# Patient Record
Sex: MALE | Race: WHITE | NOT HISPANIC OR LATINO | Employment: FULL TIME | URBAN - NONMETROPOLITAN AREA
[De-identification: names, ages, dates, MRNs, and addresses within clinical notes are randomized per-mention and may not be internally consistent; named-entity substitution may affect disease eponyms.]

---

## 2017-05-11 ENCOUNTER — HOSPITAL ENCOUNTER (OUTPATIENT)
Dept: PHYSICAL THERAPY | Facility: HOSPITAL | Age: 62
Setting detail: THERAPIES SERIES
Discharge: HOME | End: 2017-05-11

## 2017-05-11 ENCOUNTER — TRANSCRIBE ORDERS (OUTPATIENT)
Dept: PHYSICAL THERAPY | Facility: HOSPITAL | Age: 62
End: 2017-05-11

## 2017-05-11 DIAGNOSIS — M54.2 NECK PAIN: Primary | ICD-10-CM

## 2017-05-11 DIAGNOSIS — M54.2 CERVICALGIA: Primary | ICD-10-CM

## 2017-05-11 PROCEDURE — 97161 PT EVAL LOW COMPLEX 20 MIN: CPT

## 2017-05-18 ENCOUNTER — HOSPITAL ENCOUNTER (OUTPATIENT)
Dept: PHYSICAL THERAPY | Facility: HOSPITAL | Age: 62
Setting detail: THERAPIES SERIES
Discharge: HOME | End: 2017-05-18

## 2017-05-18 DIAGNOSIS — M54.2 NECK PAIN: Primary | ICD-10-CM

## 2017-05-18 PROCEDURE — 97140 MANUAL THERAPY 1/> REGIONS: CPT

## 2017-05-25 ENCOUNTER — HOSPITAL ENCOUNTER (OUTPATIENT)
Dept: PHYSICAL THERAPY | Facility: HOSPITAL | Age: 62
Setting detail: THERAPIES SERIES
Discharge: HOME | End: 2017-05-25

## 2017-05-25 DIAGNOSIS — M54.2 NECK PAIN: Primary | ICD-10-CM

## 2017-05-25 PROCEDURE — 97110 THERAPEUTIC EXERCISES: CPT

## 2017-05-25 PROCEDURE — 97140 MANUAL THERAPY 1/> REGIONS: CPT

## 2017-06-01 ENCOUNTER — HOSPITAL ENCOUNTER (OUTPATIENT)
Dept: PHYSICAL THERAPY | Facility: HOSPITAL | Age: 62
Setting detail: THERAPIES SERIES
Discharge: HOME | End: 2017-06-01

## 2017-06-01 DIAGNOSIS — M54.2 NECK PAIN: Primary | ICD-10-CM

## 2017-06-01 PROCEDURE — 97140 MANUAL THERAPY 1/> REGIONS: CPT

## 2017-06-01 PROCEDURE — 97110 THERAPEUTIC EXERCISES: CPT

## 2017-06-01 NOTE — THERAPY TREATMENT NOTE
Outpatient Physical Therapy Ortho Treatment Note  [facility]     Patient Name: Israel Campos  : 1955  MRN: [id number]  Today's Date: 2017      Visit Date: 2017    Visit Dx:    ICD-10-CM ICD-9-CM   1. Neck pain M54.2 723.1       There is no problem list on file for this patient.       Past Medical History:   Diagnosis Date   • Atrial fibrillation    • Hypertension         No past surgical history on file.                          PT Assessment/Plan       17 1400       PT Assessment    Assessment Comments Patient is still limited in upper cervical mobility with guarding noted.  The scapular upward rotation is still quite limited, placing more strain on the neck with overhead motion.  Thoracic extension endurance is also poor with eccentric UE control.  -RS     PT Plan    PT Plan Comments Assess response to HEP; thoracic mobility and focus on upright posture to decrease cervical strain.  AA mobility  -RS       User Key  (r) = Recorded By, (t) = Taken By, (c) = Cosigned By    Initials Name Provider Type    RS Danielito Junior, PT DPT Physical Therapist                    Exercises       17 1300          Subjective Comments    Subjective Comments Pain with looking up and overhead today, as well as turning to the right.  -RS      Subjective Pain    Able to rate subjective pain? yes  -RS      Pre-Treatment Pain Level 5  -RS      Post-Treatment Pain Level 2  -RS      Exercise 1    Exercise Name 1 thoracic extension against foam roller:  eccentric UE flexion with 2# dowel  -RS      Cueing 1 Demo  -RS      Sets 1 3  -RS      Reps 1 5  -RS      Time (Seconds) 1 5 sec lowering  -RS      Exercise 2    Exercise Name 2 facing serratus wall slides   used foam roller to assist  -RS      Cueing 2 Demo  -RS      Sets 2 3  -RS      Reps 2 10  -RS      Exercise 3    Exercise Name 3 standing W into Y push press  -RS      Cueing 3 Demo  -RS      Equipment 3 Theraband  -RS      Resistance 3 Yellow   -RS      Sets 3 3  -RS      Reps 3 12  -RS      Exercise 4    Exercise Name 4 january standing Paloff Press  -RS      Cueing 4 Demo  -RS      Equipment 4 Theraband  -RS      Resistance 4 Blue  -RS      Sets 4 3  -RS      Time (Seconds) 4 30 (on each)  -RS      Exercise 5    Exercise Name 5 went over HEP:  patient given instructions with demonstration adequately  -RS        User Key  (r) = Recorded By, (t) = Taken By, (c) = Cosigned By    Initials Name Provider Type    RS Danielito Junior, PT DPT Physical Therapist                        Manual Rx (last 36 hours)      Manual Treatments       06/01/17 1345          Manual Rx 1    Manual Rx 1 Location Supine neck musculature  -RS      Manual Rx 1 Type soft tissue mobilization  -RS      Manual Rx 1 Duration 10  -RS      Manual Rx 2    Manual Rx 2 Location Supine A-A  -RS      Manual Rx 2 Type PVMS  -RS      Manual Rx 2 Duration 5  -RS        User Key  (r) = Recorded By, (t) = Taken By, (c) = Cosigned By    Initials Name Provider Type    RS Danielito Junior, PT DPT Physical Therapist                PT OP Goals       06/01/17 1300       PT Short Term Goals    STG Date to Achieve 06/01/17  -RS     STG 1 Patient will report a 50% improvement in symptoms on the VAS  -RS     STG 1 Progress Ongoing  -RS     STG 1 Progress Comments 5/10 today; average 2-5/10  -RS     STG 2 Patient will demonstrate 25% improvement in cervical AROM into rotation to each side  -RS     STG 2 Progress Ongoing  -RS     STG 2 Progress Comments PROM improves after treatment; right rotation most limited today  -RS     Long Term Goals    LTG Date to Achieve 06/22/17  -RS     LTG 1 Patient will be independent with a comprehensive HEP by discharge  -RS     LTG 1 Progress Progressing  -RS     LTG 2 Patient will improve cervical AROM into all planes to at least 75% of normal with pain no greater than 2/10 on the VAS by discharge.  -RS     LTG 2 Progress Ongoing  -RS     LTG 3 Patient will demonstrate a  more neutral thoracic posture to alleviate excessive CT junction strain by discharge.  -RS     LTG 3 Progress Ongoing  -RS     LTG 4 Patient will score <4 on the NDI (neck disability index) outcome measure by discharge.  -RS     LTG 4 Progress Ongoing  -RS     Time Calculation    PT Goal Re-Cert Due Date 06/10/17  -RS       User Key  (r) = Recorded By, (t) = Taken By, (c) = Cosigned By    Initials Name Provider Type    RS Danielito Junior, PT DPT Physical Therapist                Therapy Education       06/01/17 1400          Therapy Education    Given HEP;Symptoms/condition management  -RS      Program New   serratus slides, foam roller ext, anti rotations  -RS      How Provided Written;Demonstration  -RS      Provided to Patient  -RS      Level of Understanding Demonstrated;Verbalized  -RS        User Key  (r) = Recorded By, (t) = Taken By, (c) = Cosigned By    Initials Name Provider Type    ANDER Junior, PT DPT Physical Therapist                Time Calculation:   Start Time: 1345  Stop Time: 1430  Time Calculation (min): 45 min  PT Non-Billable Time (min): 4 min  Total Timed Code Minutes- PT: 41 minute(s)    Therapy Charges for Today     Code Description Service Date Service Provider Modifiers Qty    93004497837 HC PT THER PROC EA 15 MIN 6/1/2017 Danielito Junior, PT DPT GP 2    67067221193 HC PT MANUAL THERAPY EA 15 MIN 6/1/2017 Danielito Junior, PT DPT GP 1                    KATELIN Junior, PT DPT  6/1/2017

## 2017-06-08 ENCOUNTER — HOSPITAL ENCOUNTER (OUTPATIENT)
Dept: PHYSICAL THERAPY | Facility: HOSPITAL | Age: 62
Setting detail: THERAPIES SERIES
Discharge: HOME | End: 2017-06-08

## 2017-06-08 DIAGNOSIS — M54.2 NECK PAIN: Primary | ICD-10-CM

## 2017-06-08 PROCEDURE — 97140 MANUAL THERAPY 1/> REGIONS: CPT

## 2017-06-08 NOTE — THERAPY PROGRESS REPORT/RE-CERT
Outpatient Physical Therapy Ortho Progress Note  [facility]     Patient Name: Israel Campos  : 1955  MRN: [id number]  Today's Date: 2017      Visit Date: 2017    Visit Dx:    ICD-10-CM ICD-9-CM   1. Neck pain M54.2 723.1       There is no problem list on file for this patient.       Past Medical History:   Diagnosis Date   • Atrial fibrillation    • Hypertension         No past surgical history on file.                          PT Assessment/Plan       17 0900       PT Assessment    Functional Limitations Performance in leisure activities;Performance in work activities  -RS     Impairments Joint mobility;Muscle strength;Pain;Posture;Range of motion  -RS     Assessment Comments Patient is making slow progress at this time.  The chronic postural issues and tissue adaptation are barriers to rehab, but are improving slowly with treatment.  Patient is more aware of his postural faults especially while at work.  We are working to improve the thoracic extensor and scapular component (trapezius and serratus) to help stiffen the mid thoracic in a more upright position, as well as help his golf swing.  The AA joint is still limited on the left > right and is a likely source of joint pain/referred pain.  Thank you for allowing us to work with this patient.  -RS     Please refer to paper survey for additional self-reported information Yes  -RS     Rehab Potential Good  -RS     Patient/caregiver participated in establishment of treatment plan and goals Yes  -RS     Patient would benefit from skilled therapy intervention Yes  -RS     PT Plan    PT Frequency 1x/week  -RS     Predicted Duration of Therapy Intervention (days/wks) 4 weeks  -RS     Planned CPT's? PT THER PROC EA 15 MIN: 58011;PT MANUAL THERAPY EA 15 MIN: 49842;PT NEUROMUSC RE-EDUCATION EA 15 MIN: 17820;PT HOT OR COLD PACK TREAT MCARE;PT ELECTRICAL STIM UNATTEND: ;PT ELECTRICAL STIM ATTD EA 15 MIN: 16728  -RS     Physical Therapy  Interventions (Optional Details) home exercise program;joint mobilization;manual therapy techniques;neuromuscular re-education;patient/family education;postural re-education;ROM (Range of Motion);strengthening;stretching;taping;swiss ball techniques  -RS     PT Plan Comments We will continue to improve both soft tissue and cervical joint mobility; continue with thoracic mobility at the CT junction.  Continue with periscapular strengthening with focus on thoracic extension and scapular upward rotation.  -RS       User Key  (r) = Recorded By, (t) = Taken By, (c) = Cosigned By    Initials Name Provider Type    RS Danielito Junior, PT DPT Physical Therapist                    Exercises       06/08/17 1000          Subjective Comments    Subjective Comments Patient reporting continued nonconsistent compliance with his HEP.  He does claim he feels a positive correlation with performing the HEP and decreased pain.  -RS      Subjective Pain    Able to rate subjective pain? yes  -RS      Pre-Treatment Pain Level 4  -RS      Post-Treatment Pain Level 3  -RS      Exercise 1    Exercise Name 1 Prone hands behind head with scapular adduction  -RS      Cueing 1 Verbal  -RS      Sets 1 3  -RS      Reps 1 10  -RS        User Key  (r) = Recorded By, (t) = Taken By, (c) = Cosigned By    Initials Name Provider Type    RS Danielito Junior, PT DPT Physical Therapist                        Manual Rx (last 36 hours)      Manual Treatments       06/08/17 0900          Manual Rx 1    Manual Rx 1 Location Prone neck musculature   -RS      Manual Rx 1 Type soft tissue mobilization with EDGE tool  -RS      Manual Rx 1 Grade min to mod OP  -RS      Manual Rx 1 Duration 15  -RS      Manual Rx 2    Manual Rx 2 Location Prone cervical spine /unilateral PAs  -RS      Manual Rx 2 Type Oscillatory  -RS      Manual Rx 2 Grade 2/3  -RS      Manual Rx 2 Duration 10  -RS      Manual Rx 3    Manual Rx 3 Location prone upper thoracic  -RS       Manual Rx 3 Type oscillatory  -RS      Manual Rx 3 Grade 3/4  -RS      Manual Rx 3 Duration 10  -RS      Manual Rx 4    Manual Rx 4 Location prone january AA/OA   -RS      Manual Rx 4 Type unilateral and central PA  -RS      Manual Rx 4 Grade 2/3 oscillatory  -RS      Manual Rx 4 Duration 3 on each  -RS        User Key  (r) = Recorded By, (t) = Taken By, (c) = Cosigned By    Initials Name Provider Type    RS Danielito Junior, PT DPT Physical Therapist                PT OP Goals       06/08/17 1000       PT Short Term Goals    STG Date to Achieve 06/01/17  -RS     STG 1 Patient will report a 50% improvement in symptoms on the VAS  -RS     STG 1 Progress Progressing  -RS     STG 1 Progress Comments 4/10; improving  -RS     STG 2 Patient will demonstrate 25% improvement in cervical AROM into rotation to each side  -RS     STG 2 Progress Ongoing  -RS     STG 2 Progress Comments left AROM limited 50% into rotation; right rotation STG met.  -RS     Long Term Goals    LTG Date to Achieve 06/22/17  -RS     LTG 1 Patient will be independent with a comprehensive HEP by discharge  -RS     LTG 1 Progress Progressing  -RS     LTG 2 Patient will improve cervical AROM into all planes to at least 75% of normal with pain no greater than 2/10 on the VAS by discharge.  -RS     LTG 2 Progress Ongoing  -RS     LTG 2 Progress Comments 50% limited left rotation; 2-4/10 symptoms  -RS     LTG 3 Patient will demonstrate a more neutral thoracic posture to alleviate excessive CT junction strain by discharge.  -RS     LTG 3 Progress Progressing  -RS     LTG 4 Patient will score <4 on the NDI (neck disability index) outcome measure by discharge.  -RS     LTG 4 Progress Ongoing  -RS     LTG 4 Progress Comments Patient scored a 10 on the NDI.  -RS     Time Calculation    PT Goal Re-Cert Due Date 07/08/17  -RS       User Key  (r) = Recorded By, (t) = Taken By, (c) = Cosigned By    Initials Name Provider Type    ANDER Junior, PT DPT  Physical Therapist                Therapy Education       06/08/17 1100          Therapy Education    Given HEP;Symptoms/condition management  -RS      Program Reinforced   serratus slides, foam roller ext, anti rotations  -RS      How Provided Written;Demonstration  -RS      Provided to Patient  -RS      Level of Understanding Demonstrated;Verbalized  -RS        User Key  (r) = Recorded By, (t) = Taken By, (c) = Cosigned By    Initials Name Provider Type    RS Danielito Junior, PT DPT Physical Therapist                Outcome Measures       06/08/17 1015          Neck Disability Index    Section 1 - Pain Intensity 2  -RS      Section 2 - Personal Care 1  -RS      Section 3 - Lifting 1  -RS      Section 4 - Work 0  -RS      Section 5 - Headaches 1  -RS      Section 6 - Concentration 0  -RS      Section 7 - Sleeping 1  -RS      Section 8 - Driving 1  -RS      Section 9 - Reading 2  -RS      Section 10 - Recreation 1  -RS      Neck Disability Index Score 10  -RS      Functional Assessment    Outcome Measure Options Neck Disability Index (NDI)  -RS        User Key  (r) = Recorded By, (t) = Taken By, (c) = Cosigned By    Initials Name Provider Type    RS Danielito Junior, PT DPT Physical Therapist            Time Calculation:   Start Time: 1018  Stop Time: 1103  Time Calculation (min): 45 min  Total Timed Code Minutes- PT: 45 minute(s)    Therapy Charges for Today     Code Description Service Date Service Provider Modifiers Qty    72164243392 HC PT MANUAL THERAPY EA 15 MIN 6/8/2017 Danielito Junior, PT DPT GP 3          PT G-Codes  Outcome Measure Options: Neck Disability Index (NDI)         KATELIN Junior, PT DPT  6/8/2017

## 2017-06-15 ENCOUNTER — TELEPHONE (OUTPATIENT)
Dept: INTERNAL MEDICINE | Age: 62
End: 2017-06-15

## 2017-06-15 ENCOUNTER — APPOINTMENT (OUTPATIENT)
Dept: PHYSICAL THERAPY | Facility: HOSPITAL | Age: 62
End: 2017-06-15

## 2017-06-22 ENCOUNTER — APPOINTMENT (OUTPATIENT)
Dept: PHYSICAL THERAPY | Facility: HOSPITAL | Age: 62
End: 2017-06-22

## 2017-06-29 ENCOUNTER — APPOINTMENT (OUTPATIENT)
Dept: PHYSICAL THERAPY | Facility: HOSPITAL | Age: 62
End: 2017-06-29

## 2017-07-05 RX ORDER — ALPRAZOLAM 0.25 MG/1
0.25 TABLET ORAL 2 TIMES DAILY PRN
COMMUNITY
End: 2017-07-05 | Stop reason: SDUPTHER

## 2017-07-06 RX ORDER — ALPRAZOLAM 0.25 MG/1
0.25 TABLET ORAL 2 TIMES DAILY PRN
Qty: 180 TABLET | Refills: 1 | Status: SHIPPED | OUTPATIENT
Start: 2017-07-06 | End: 2018-02-14 | Stop reason: SDUPTHER

## 2017-07-13 ENCOUNTER — APPOINTMENT (OUTPATIENT)
Dept: PHYSICAL THERAPY | Facility: HOSPITAL | Age: 62
End: 2017-07-13

## 2017-07-13 DIAGNOSIS — I10 ESSENTIAL HYPERTENSION, BENIGN: Primary | ICD-10-CM

## 2017-07-13 DIAGNOSIS — E78.5 HYPERLIPIDEMIA, UNSPECIFIED: ICD-10-CM

## 2017-07-13 DIAGNOSIS — Z00.00 ANNUAL PHYSICAL EXAM: ICD-10-CM

## 2017-07-13 RX ORDER — LOSARTAN POTASSIUM 50 MG/1
50 TABLET ORAL DAILY
Qty: 90 TABLET | Refills: 3 | Status: SHIPPED | OUTPATIENT
Start: 2017-07-13 | End: 2018-07-13 | Stop reason: SDUPTHER

## 2017-07-13 RX ORDER — ATENOLOL 50 MG/1
TABLET ORAL
COMMUNITY
Start: 2017-07-02 | End: 2017-10-06 | Stop reason: SDUPTHER

## 2017-07-17 RX ORDER — SIMVASTATIN 20 MG
TABLET ORAL
Qty: 90 TABLET | Refills: 2 | Status: SHIPPED | OUTPATIENT
Start: 2017-07-17 | End: 2018-04-16 | Stop reason: SDUPTHER

## 2017-07-20 ENCOUNTER — APPOINTMENT (OUTPATIENT)
Dept: PHYSICAL THERAPY | Facility: HOSPITAL | Age: 62
End: 2017-07-20

## 2017-08-09 ENCOUNTER — DOCUMENTATION (OUTPATIENT)
Dept: PHYSICAL THERAPY | Facility: HOSPITAL | Age: 62
End: 2017-08-09

## 2017-08-09 DIAGNOSIS — M54.2 NECK PAIN: Primary | ICD-10-CM

## 2017-08-09 NOTE — THERAPY DISCHARGE NOTE
Outpatient Physical Therapy Discharge Summary         Patient Name: Israel Campos  : 1955  MRN: [id number]    Today's Date: 2017    Visit Dx:    ICD-10-CM ICD-9-CM   1. Neck pain M54.2 723.1             PT OP Goals       17 1300       PT Short Term Goals    STG Date to Achieve 17  -RS     STG 1 Patient will report a 50% improvement in symptoms on the VAS  -RS     STG 1 Progress Not Met  -RS     STG 2 Patient will demonstrate 25% improvement in cervical AROM into rotation to each side  -RS     STG 2 Progress Not Met  -RS     Long Term Goals    LTG Date to Achieve 17  -RS     LTG 1 Patient will be independent with a comprehensive HEP by discharge  -RS     LTG 1 Progress Not Met  -RS     LTG 2 Patient will improve cervical AROM into all planes to at least 75% of normal with pain no greater than 2/10 on the VAS by discharge.  -RS     LTG 2 Progress Not Met  -RS     LTG 3 Patient will demonstrate a more neutral thoracic posture to alleviate excessive CT junction strain by discharge.  -RS     LTG 3 Progress Not Met  -RS     LTG 4 Patient will score <4 on the NDI (neck disability index) outcome measure by discharge.  -RS     LTG 4 Progress Not Met  -RS       User Key  (r) = Recorded By, (t) = Taken By, (c) = Cosigned By    Initials Name Provider Type    ANDER Junior, PT DPT Physical Therapist          OP PT Discharge Summary  Date of Discharge: 17  Reason for Discharge: Unable to participate  Outcomes Achieved: Unable to tolerate or actively participate in therapy, Refer to plan of care for updates on goals achieved  Discharge Destination: Unknown  Discharge Instructions: Patient was unable to consistently attend due to scheduling and personal issues.  I spoke with the patient on the phone and encouraged the patient to return to Dr. Campbell for imaging if the symptoms are not improved.      Time Calculation:                    KATELIN Junior, PT DPT  2017

## 2017-09-18 ENCOUNTER — TELEPHONE (OUTPATIENT)
Dept: INTERNAL MEDICINE | Age: 62
End: 2017-09-18

## 2017-09-18 RX ORDER — BENZONATATE 200 MG/1
200 CAPSULE ORAL 3 TIMES DAILY PRN
Qty: 30 CAPSULE | Refills: 0 | Status: SHIPPED | OUTPATIENT
Start: 2017-09-18 | End: 2017-09-25

## 2017-09-23 RX ORDER — METHYLPREDNISOLONE 4 MG/1
TABLET ORAL
Qty: 1 KIT | Refills: 0 | Status: SHIPPED | OUTPATIENT
Start: 2017-09-23 | End: 2017-09-29

## 2017-09-25 ENCOUNTER — TELEPHONE (OUTPATIENT)
Dept: INTERNAL MEDICINE | Age: 62
End: 2017-09-25

## 2017-09-25 RX ORDER — AZITHROMYCIN 250 MG/1
TABLET, FILM COATED ORAL
Qty: 1 PACKET | Refills: 0 | Status: SHIPPED | OUTPATIENT
Start: 2017-09-25 | End: 2017-10-05

## 2017-10-03 ENCOUNTER — PATIENT MESSAGE (OUTPATIENT)
Dept: INTERNAL MEDICINE | Age: 62
End: 2017-10-03

## 2017-10-04 NOTE — TELEPHONE ENCOUNTER
From: Sonia Granger  To: Kayy Escoto MD  Sent: 10/3/2017 6:48 PM CDT  Subject: Non-Urgent Medical Question    Is this system working? Sent message yesterday and haven't got a reply.

## 2017-10-04 NOTE — TELEPHONE ENCOUNTER
I do apologize for the delay in our response. You can try Mucinex over the counter.  is out of the office until Monday but if you would like to be seen by another provider I can make an appointment for you. Again I do apologize for the delay.   Thank you  Brandon Alejandro

## 2017-10-06 RX ORDER — ATENOLOL 50 MG/1
TABLET ORAL
Qty: 90 TABLET | Refills: 1 | Status: SHIPPED | OUTPATIENT
Start: 2017-10-06 | End: 2018-04-16 | Stop reason: SDUPTHER

## 2017-10-12 ENCOUNTER — PATIENT MESSAGE (OUTPATIENT)
Dept: INTERNAL MEDICINE | Age: 62
End: 2017-10-12

## 2017-10-12 DIAGNOSIS — R73.9 HYPERGLYCEMIA: ICD-10-CM

## 2017-10-12 DIAGNOSIS — E78.49 OTHER HYPERLIPIDEMIA: Primary | ICD-10-CM

## 2017-10-13 NOTE — TELEPHONE ENCOUNTER
From: Ran Atkins  To: Evita Ceballos MD  Sent: 10/12/2017 12:36 PM CDT  Subject: Non-Urgent Medical Question    Can you recommend a nutritionist to lose weight?  Thanks

## 2017-10-24 ENCOUNTER — TELEPHONE (OUTPATIENT)
Dept: INTERNAL MEDICINE | Age: 62
End: 2017-10-24

## 2018-02-14 RX ORDER — ALPRAZOLAM 0.25 MG/1
0.25 TABLET ORAL 2 TIMES DAILY PRN
Qty: 180 TABLET | Refills: 0 | Status: SHIPPED | OUTPATIENT
Start: 2018-02-14 | End: 2018-07-13 | Stop reason: SDUPTHER

## 2018-02-20 ENCOUNTER — PATIENT MESSAGE (OUTPATIENT)
Dept: INTERNAL MEDICINE | Age: 63
End: 2018-02-20

## 2018-02-20 NOTE — TELEPHONE ENCOUNTER
From: Ted Montes De Oca  To: Soraida Connell MD  Sent: 2/20/2018 1:45 PM CST  Subject: Prescription Question    Please refill Xanax.  Thanks

## 2018-04-04 ENCOUNTER — TELEPHONE (OUTPATIENT)
Dept: INTERNAL MEDICINE | Age: 63
End: 2018-04-04

## 2018-04-13 ENCOUNTER — TELEPHONE (OUTPATIENT)
Dept: INTERNAL MEDICINE | Age: 63
End: 2018-04-13

## 2018-04-16 RX ORDER — ATENOLOL 50 MG/1
TABLET ORAL
Qty: 90 TABLET | Refills: 3 | Status: SHIPPED | OUTPATIENT
Start: 2018-04-16 | End: 2019-03-26 | Stop reason: SDUPTHER

## 2018-04-16 RX ORDER — SIMVASTATIN 20 MG
TABLET ORAL
Qty: 90 TABLET | Refills: 3 | Status: SHIPPED | OUTPATIENT
Start: 2018-04-16 | End: 2019-04-15 | Stop reason: SDUPTHER

## 2018-05-14 RX ORDER — ESCITALOPRAM OXALATE 10 MG/1
TABLET ORAL
Qty: 30 TABLET | Refills: 5 | Status: SHIPPED | OUTPATIENT
Start: 2018-05-14 | End: 2018-10-15 | Stop reason: SDUPTHER

## 2018-07-12 DIAGNOSIS — E78.5 HYPERLIPIDEMIA, UNSPECIFIED: ICD-10-CM

## 2018-07-12 DIAGNOSIS — Z00.00 ANNUAL PHYSICAL EXAM: ICD-10-CM

## 2018-07-12 DIAGNOSIS — I10 ESSENTIAL HYPERTENSION, BENIGN: ICD-10-CM

## 2018-07-12 LAB
ALBUMIN SERPL-MCNC: 4.3 G/DL (ref 3.5–5.2)
ALP BLD-CCNC: 91 U/L (ref 40–130)
ALT SERPL-CCNC: 38 U/L (ref 5–41)
ANION GAP SERPL CALCULATED.3IONS-SCNC: 14 MMOL/L (ref 7–19)
AST SERPL-CCNC: 28 U/L (ref 5–40)
BILIRUB SERPL-MCNC: 0.5 MG/DL (ref 0.2–1.2)
BUN BLDV-MCNC: 13 MG/DL (ref 8–23)
CALCIUM SERPL-MCNC: 9.2 MG/DL (ref 8.8–10.2)
CHLORIDE BLD-SCNC: 101 MMOL/L (ref 98–111)
CHOLESTEROL, TOTAL: 211 MG/DL (ref 160–199)
CO2: 25 MMOL/L (ref 22–29)
CREAT SERPL-MCNC: 0.9 MG/DL (ref 0.5–1.2)
GFR NON-AFRICAN AMERICAN: >60
GLUCOSE BLD-MCNC: 115 MG/DL (ref 74–109)
HBA1C MFR BLD: 5.7 % (ref 4–6)
HCT VFR BLD CALC: 41.5 % (ref 42–52)
HDLC SERPL-MCNC: 46 MG/DL (ref 55–121)
HEMOGLOBIN: 13.5 G/DL (ref 14–18)
LDL CHOLESTEROL CALCULATED: 121 MG/DL
MCH RBC QN AUTO: 27.8 PG (ref 27–31)
MCHC RBC AUTO-ENTMCNC: 32.5 G/DL (ref 33–37)
MCV RBC AUTO: 85.6 FL (ref 80–94)
PDW BLD-RTO: 12.1 % (ref 11.5–14.5)
PLATELET # BLD: 216 K/UL (ref 130–400)
PMV BLD AUTO: 10.7 FL (ref 9.4–12.4)
POTASSIUM SERPL-SCNC: 3.8 MMOL/L (ref 3.5–5)
PROSTATE SPECIFIC ANTIGEN: 1.96 NG/ML (ref 0–4)
RBC # BLD: 4.85 M/UL (ref 4.7–6.1)
SODIUM BLD-SCNC: 140 MMOL/L (ref 136–145)
TESTOSTERONE TOTAL: 202.2 NG/DL (ref 193–740)
TOTAL PROTEIN: 6.8 G/DL (ref 6.6–8.7)
TRIGL SERPL-MCNC: 218 MG/DL (ref 0–149)
WBC # BLD: 7.6 K/UL (ref 4.8–10.8)

## 2018-07-13 ENCOUNTER — OFFICE VISIT (OUTPATIENT)
Dept: INTERNAL MEDICINE | Age: 63
End: 2018-07-13
Payer: COMMERCIAL

## 2018-07-13 VITALS
BODY MASS INDEX: 30.16 KG/M2 | WEIGHT: 199 LBS | HEIGHT: 68 IN | RESPIRATION RATE: 18 BRPM | SYSTOLIC BLOOD PRESSURE: 176 MMHG | HEART RATE: 80 BPM | DIASTOLIC BLOOD PRESSURE: 98 MMHG | OXYGEN SATURATION: 97 %

## 2018-07-13 DIAGNOSIS — E29.1 HYPOGONADISM MALE: Chronic | ICD-10-CM

## 2018-07-13 DIAGNOSIS — Z79.899 HIGH RISK MEDICATION USE: ICD-10-CM

## 2018-07-13 DIAGNOSIS — R73.03 PREDIABETES: Chronic | ICD-10-CM

## 2018-07-13 DIAGNOSIS — E78.2 MIXED HYPERLIPIDEMIA: Chronic | ICD-10-CM

## 2018-07-13 DIAGNOSIS — F33.40 RECURRENT MAJOR DEPRESSIVE DISORDER, IN REMISSION (HCC): Chronic | ICD-10-CM

## 2018-07-13 DIAGNOSIS — I10 ESSENTIAL HYPERTENSION: Primary | Chronic | ICD-10-CM

## 2018-07-13 DIAGNOSIS — R55 VASOVAGAL SYNCOPE: Chronic | ICD-10-CM

## 2018-07-13 DIAGNOSIS — I48.0 PAROXYSMAL ATRIAL FIBRILLATION (HCC): Chronic | ICD-10-CM

## 2018-07-13 PROBLEM — F32.5 MAJOR DEPRESSIVE DISORDER IN REMISSION (HCC): Chronic | Status: ACTIVE | Noted: 2018-07-13

## 2018-07-13 LAB
AMPHETAMINE SCREEN, URINE: NORMAL
BARBITURATE SCREEN, URINE: NORMAL
BENZODIAZEPINE SCREEN, URINE: NORMAL
COCAINE METABOLITE SCREEN URINE: NORMAL
MDMA URINE: NORMAL
METHADONE SCREEN, URINE: NORMAL
METHAMPHETAMINE, URINE: NORMAL
OPIATE SCREEN URINE: NORMAL
OXYCODONE SCREEN URINE: NORMAL
PHENCYCLIDINE SCREEN URINE: NORMAL
PROPOXYPHENE SCREEN, URINE: NORMAL
THC: NORMAL
TRICYCLIC ANTIDEPRESSANTS, UR: NORMAL

## 2018-07-13 PROCEDURE — 99396 PREV VISIT EST AGE 40-64: CPT | Performed by: INTERNAL MEDICINE

## 2018-07-13 PROCEDURE — 80305 DRUG TEST PRSMV DIR OPT OBS: CPT | Performed by: INTERNAL MEDICINE

## 2018-07-13 RX ORDER — ALPRAZOLAM 0.25 MG/1
0.25 TABLET ORAL 2 TIMES DAILY PRN
Qty: 180 TABLET | Refills: 0
Start: 2018-07-13 | End: 2018-08-16 | Stop reason: SDUPTHER

## 2018-07-13 RX ORDER — LOSARTAN POTASSIUM 100 MG/1
100 TABLET ORAL DAILY
Qty: 90 TABLET | Refills: 3 | Status: SHIPPED | OUTPATIENT
Start: 2018-07-13 | End: 2019-03-15

## 2018-07-13 ASSESSMENT — ENCOUNTER SYMPTOMS
SHORTNESS OF BREATH: 0
NAUSEA: 0
CONSTIPATION: 0
BACK PAIN: 0
SINUS PRESSURE: 0
TROUBLE SWALLOWING: 0
COUGH: 0
SORE THROAT: 0
RHINORRHEA: 0
ABDOMINAL PAIN: 0
DIARRHEA: 0
COLOR CHANGE: 0
BLOOD IN STOOL: 0

## 2018-07-13 ASSESSMENT — PATIENT HEALTH QUESTIONNAIRE - PHQ9
SUM OF ALL RESPONSES TO PHQ QUESTIONS 1-9: 0
2. FEELING DOWN, DEPRESSED OR HOPELESS: 0
SUM OF ALL RESPONSES TO PHQ9 QUESTIONS 1 & 2: 0
1. LITTLE INTEREST OR PLEASURE IN DOING THINGS: 0

## 2018-07-13 NOTE — PROGRESS NOTES
Chief Complaint   Patient presents with    Annual Exam    Hyperlipidemia    Immunizations     patient declines the pneumonia vaccines. HPI:   Some family stressors. Remains  On Lexapro with good results. Denies feeling depressed at present. Has gained some weight and drinking more alcohol recently. Typically has a couple of drinks after work most days. He has noted some fatigue and we did check a testosterone. Has fairly normal libido he feels. No tachycardia or palpitations. No issues with PAF    He has some problems with vagal symptoms and tachycardia  With stress. Is to have a left upper arm mole and a left cheek lesion biopsied. Worries about having problems with the removal of the mole and  Cheek lesion. BP has been typically about 130s/80s at home with HR about 70 on average. His cuff tends to run about 5-10 mm lower. Hyperlipidemia    Lipids are currently being treated with simvistatin  No reported side effects from lipid medication. Low-fat diet is being followed. He has noted some tremulousness or tremor at times with intention only. No resting tremor. No known family tremor that he is aware of but parents  fairly young. Past Medical History:   Diagnosis Date    Essential hypertension 2018    Hypogonadism male 2018    Major depressive disorder in remission 2018    Mixed hyperlipidemia 2018    Paroxysmal atrial fibrillation ([address] 2018    Prediabetes 2018    Vasovagal syncope 2018      No past surgical history on file.    Family History   Problem Relation Age of Onset   [facility] Cancer Mother         nasal cavity    Stroke Father         ischemic    Other Father 79        mitral regurgitation h/o MV repair at age 79    Depression Brother       Social History     Social History    Marital status:      Spouse name: Ralf Nguyen    Number of children: 2    Years of education: 12     Occupational History    QLHCOQQ adenopathy. Does not bruise/bleed easily. Psychiatric/Behavioral: Negative. Per HPI        BP (!) 176/98   Pulse 80   Resp 18   Ht 5' 8\" (1.727 m)   Wt 199 lb (90.3 kg)   SpO2 97%   BMI 30.26 kg/m²    Physical Exam   Constitutional: He is oriented to person, place, and time. He appears well-developed and well-nourished. No distress. HENT:   Head: Normocephalic and atraumatic. Right Ear: External ear normal.   Left Ear: External ear normal.   Nose: Nose normal.   Mouth/Throat: Oropharynx is clear and moist.   Tympanic membranes normal   Eyes: Conjunctivae and EOM are normal. Pupils are equal, round, and reactive to light. Right eye exhibits no discharge. Left eye exhibits no discharge. No scleral icterus. Fundiscopic exam normal   Neck: No JVD present. No thyromegaly present. Cardiovascular: Normal rate, regular rhythm, normal heart sounds and intact distal pulses. Exam reveals no gallop. No murmur heard. Pulses:       Dorsalis pedis pulses are 2+ on the right side, and 2+ on the left side. Posterior tibial pulses are 2+ on the right side, and 2+ on the left side. No Carotid or abdominal bruits   Pulmonary/Chest: Effort normal and breath sounds normal. No respiratory distress. He exhibits no tenderness. Abdominal: Soft. Bowel sounds are normal. He exhibits no distension and no mass. There is no tenderness. Genitourinary: Rectum normal and prostate normal. Rectal exam shows guaiac negative stool. Musculoskeletal: Normal range of motion. He exhibits no edema or tenderness. Lymphadenopathy:     He has no cervical adenopathy. Neurological: He is alert and oriented to person, place, and time. No cranial nerve deficit. Coordination normal.   No focal weakness  No tremor with finger to nose testing. No resting tremor   Skin: Skin is dry. No rash noted. No erythema.    He has a lentigo on the left cheek and an atypical nevus on the right posterior upper arm   Psychiatric: He has a normal mood and affect. His behavior is normal. Judgment and thought content normal.   Vitals reviewed.        Results for orders placed or performed in visit on 07/13/18   POCT Rapid Drug Screen   Result Value Ref Range    Amphetamine Screen, Urine neg     Barbiturate Screen, Urine neg     Benzodiazepine Screen, Urine pos     COCAINE METABOLITE SCREEN URINE neg     THC neg     MDMA URINE neg     Methadone Screen, Urine neg     Opiate Scrn, Ur neg     Oxycodone Screen, Ur neg     PCP Scrn, Ur neg     Propoxyphene Screen, Urine neg     Tricyclic Antidepressants, Ur neg     Methamphetamine, Urine neg       Lab Results   Component Value Date     07/12/2018    K 3.8 07/12/2018     07/12/2018    CO2 25 07/12/2018    BUN 13 07/12/2018    CREATININE 0.9 07/12/2018    GLUCOSE 115 (H) 07/12/2018    CALCIUM 9.2 07/12/2018    PROT 6.8 07/12/2018    LABALBU 4.3 07/12/2018    BILITOT 0.5 07/12/2018    ALKPHOS 91 07/12/2018    AST 28 07/12/2018    ALT 38 07/12/2018    LABGLOM >60 07/12/2018        Lab Results   Component Value Date    WBC 7.6 07/12/2018    HGB 13.5 (L) 07/12/2018    HCT 41.5 (L) 07/12/2018    MCV 85.6 07/12/2018     07/12/2018      Lab Results   Component Value Date    PSA 1.96 07/12/2018      Lab Results   Component Value Date    TESTOSTERONE 202.2 07/12/2018      Lab Results   Component Value Date    CHOL 211 (H) 07/12/2018     Lab Results   Component Value Date    TRIG 218 (H) 07/12/2018     Lab Results   Component Value Date    HDL 46 (L) 07/12/2018     Lab Results   Component Value Date    [address] 07/12/2018     No results found for: LABVLDL, VLDL  No results found for: [facility]      Patient Active Problem List   Diagnosis    Mixed hyperlipidemia    Essential hypertension    Paroxysmal atrial fibrillation (HCC)    Major depressive disorder in remission    Vasovagal syncope/neurocardiogenic syncope    Hypogonadism male    Prediabetes       DIAGNOSES:    ICD-10-CM ICD-9-CM

## 2018-08-13 RX ORDER — LOSARTAN POTASSIUM 50 MG/1
TABLET ORAL
Qty: 90 TABLET | Refills: 3 | Status: SHIPPED | OUTPATIENT
Start: 2018-08-13 | End: 2019-03-15

## 2018-08-16 DIAGNOSIS — R55 VASOVAGAL SYNCOPE: Chronic | ICD-10-CM

## 2018-08-16 DIAGNOSIS — F33.40 RECURRENT MAJOR DEPRESSIVE DISORDER, IN REMISSION (HCC): Chronic | ICD-10-CM

## 2018-08-16 RX ORDER — ALPRAZOLAM 0.25 MG/1
TABLET ORAL
Qty: 180 TABLET | Refills: 0 | Status: SHIPPED | OUTPATIENT
Start: 2018-08-16 | End: 2019-03-15 | Stop reason: SDUPTHER

## 2018-08-16 NOTE — TELEPHONE ENCOUNTER
KELLEN: 08/16/18  Last appt:7/13/2018  Next appt:1/18/2019  Requested Prescriptions     Pending Prescriptions Disp Refills    ALPRAZolam (XANAX) 0.25 MG tablet [Pharmacy Med Name: ALPRAZolam 0.25MG   TAB] 180 tablet 0     Sig: TAKE 1 TABLET BY MOUTH TWICE DAILY AS NEEDED FOR ANXIETY

## 2018-10-15 RX ORDER — ESCITALOPRAM OXALATE 10 MG/1
10 TABLET ORAL DAILY
Qty: 90 TABLET | Refills: 3 | Status: SHIPPED | OUTPATIENT
Start: 2018-10-15 | End: 2019-04-16 | Stop reason: SDUPTHER

## 2018-10-15 NOTE — TELEPHONE ENCOUNTER
Requested Prescriptions     Pending Prescriptions Disp Refills    escitalopram (LEXAPRO) 10 MG tablet 90 tablet 3     Sig: Take 1 tablet by mouth daily

## 2018-11-28 ENCOUNTER — NURSE TRIAGE (OUTPATIENT)
Dept: CALL CENTER | Facility: HOSPITAL | Age: 63
End: 2018-11-28

## 2018-11-29 NOTE — TELEPHONE ENCOUNTER
"Caller concerned about her 's behavior after drinking. States he is on medications. Ascertained that caller does feel safe at present time. Advised caller to speak with her 's PCP about her concerns. Also advised she may benefit from [place].     Reason for Disposition  • [1] Caller requesting NON-URGENT health information AND [2] PCP's office is the best resource    Additional Information  • Negative: [1] Caller is not with the adult (patient) AND [2] reporting urgent symptoms  • Negative: Lab result questions  • Negative: Medication questions  • Negative: Caller cannot be reached by phone  • Negative: Caller has already spoken to PCP or another triager  • Negative: RN needs further essential information from caller in order to complete triage  • Negative: Requesting regular office appointment    Answer Assessment - Initial Assessment Questions  1. REASON FOR CALL or QUESTION: \"What is your reason for calling today?\" or \"How can I best help you?\" or \"What question do you have that I can help answer?\"      Caller is concerned about her 's behavior.    Protocols used: INFORMATION ONLY CALL-ADULT-      "

## 2019-03-12 ENCOUNTER — TELEPHONE (OUTPATIENT)
Dept: INTERNAL MEDICINE | Age: 64
End: 2019-03-12

## 2019-03-12 RX ORDER — CEFUROXIME AXETIL 500 MG/1
500 TABLET ORAL 2 TIMES DAILY
Qty: 14 TABLET | Refills: 0 | Status: SHIPPED | OUTPATIENT
Start: 2019-03-12 | End: 2019-03-19

## 2019-03-14 DIAGNOSIS — I10 ESSENTIAL HYPERTENSION: Chronic | ICD-10-CM

## 2019-03-14 DIAGNOSIS — R55 VASOVAGAL SYNCOPE: Chronic | ICD-10-CM

## 2019-03-14 DIAGNOSIS — E29.1 HYPOGONADISM MALE: Chronic | ICD-10-CM

## 2019-03-14 DIAGNOSIS — Z79.899 HIGH RISK MEDICATION USE: ICD-10-CM

## 2019-03-14 DIAGNOSIS — F33.40 RECURRENT MAJOR DEPRESSIVE DISORDER, IN REMISSION (HCC): Chronic | ICD-10-CM

## 2019-03-14 DIAGNOSIS — I48.0 PAROXYSMAL ATRIAL FIBRILLATION (HCC): Chronic | ICD-10-CM

## 2019-03-14 DIAGNOSIS — E78.2 MIXED HYPERLIPIDEMIA: Chronic | ICD-10-CM

## 2019-03-14 DIAGNOSIS — R73.03 PREDIABETES: Chronic | ICD-10-CM

## 2019-03-14 LAB
ALBUMIN SERPL-MCNC: 4.6 G/DL (ref 3.5–5.2)
ALP BLD-CCNC: 108 U/L (ref 40–130)
ALT SERPL-CCNC: 40 U/L (ref 5–41)
ANION GAP SERPL CALCULATED.3IONS-SCNC: 12 MMOL/L (ref 7–19)
AST SERPL-CCNC: 32 U/L (ref 5–40)
BILIRUB SERPL-MCNC: 0.6 MG/DL (ref 0.2–1.2)
BUN BLDV-MCNC: 14 MG/DL (ref 8–23)
CALCIUM SERPL-MCNC: 9.3 MG/DL (ref 8.8–10.2)
CHLORIDE BLD-SCNC: 99 MMOL/L (ref 98–111)
CHOLESTEROL, TOTAL: 206 MG/DL (ref 160–199)
CO2: 27 MMOL/L (ref 22–29)
CREAT SERPL-MCNC: 1.1 MG/DL (ref 0.5–1.2)
GFR NON-AFRICAN AMERICAN: >60
GLUCOSE BLD-MCNC: 119 MG/DL (ref 74–109)
HDLC SERPL-MCNC: 42 MG/DL (ref 55–121)
LDL CHOLESTEROL CALCULATED: 138 MG/DL
LUTEINIZING HORMONE: 2.9 MIU/ML
POTASSIUM SERPL-SCNC: 4.4 MMOL/L (ref 3.5–5)
SODIUM BLD-SCNC: 138 MMOL/L (ref 136–145)
TESTOSTERONE TOTAL: 383.2 NG/DL (ref 193–740)
TOTAL PROTEIN: 7.4 G/DL (ref 6.6–8.7)
TRIGL SERPL-MCNC: 128 MG/DL (ref 0–149)

## 2019-03-15 ENCOUNTER — OFFICE VISIT (OUTPATIENT)
Dept: INTERNAL MEDICINE | Age: 64
End: 2019-03-15
Payer: COMMERCIAL

## 2019-03-15 VITALS
WEIGHT: 197.8 LBS | OXYGEN SATURATION: 98 % | BODY MASS INDEX: 29.98 KG/M2 | DIASTOLIC BLOOD PRESSURE: 91 MMHG | HEART RATE: 113 BPM | TEMPERATURE: 96 F | SYSTOLIC BLOOD PRESSURE: 148 MMHG | HEIGHT: 68 IN

## 2019-03-15 DIAGNOSIS — E78.2 MIXED HYPERLIPIDEMIA: ICD-10-CM

## 2019-03-15 DIAGNOSIS — Z00.00 ROUTINE GENERAL MEDICAL EXAMINATION AT A HEALTH CARE FACILITY: Primary | ICD-10-CM

## 2019-03-15 DIAGNOSIS — F33.40 RECURRENT MAJOR DEPRESSIVE DISORDER, IN REMISSION (HCC): Chronic | ICD-10-CM

## 2019-03-15 DIAGNOSIS — Z12.5 SCREENING FOR PROSTATE CANCER: ICD-10-CM

## 2019-03-15 DIAGNOSIS — R73.03 PREDIABETES: ICD-10-CM

## 2019-03-15 DIAGNOSIS — I48.0 PAROXYSMAL ATRIAL FIBRILLATION (HCC): ICD-10-CM

## 2019-03-15 DIAGNOSIS — E29.1 HYPOGONADISM MALE: ICD-10-CM

## 2019-03-15 DIAGNOSIS — R55 VASOVAGAL SYNCOPE: Chronic | ICD-10-CM

## 2019-03-15 DIAGNOSIS — I10 ESSENTIAL HYPERTENSION: ICD-10-CM

## 2019-03-15 PROCEDURE — 99214 OFFICE O/P EST MOD 30 MIN: CPT | Performed by: PHYSICIAN ASSISTANT

## 2019-03-15 RX ORDER — ALPRAZOLAM 0.25 MG/1
0.25 TABLET ORAL 2 TIMES DAILY PRN
Qty: 180 TABLET | Refills: 0 | Status: SHIPPED | OUTPATIENT
Start: 2019-03-15 | End: 2019-09-05 | Stop reason: SDUPTHER

## 2019-03-15 RX ORDER — LOSARTAN POTASSIUM 100 MG/1
100 TABLET ORAL DAILY
Qty: 90 TABLET | Refills: 3 | Status: SHIPPED | OUTPATIENT
Start: 2019-03-15

## 2019-03-15 RX ORDER — ALPRAZOLAM 0.25 MG/1
0.25 TABLET ORAL PRN
COMMUNITY
End: 2019-03-15 | Stop reason: SDUPTHER

## 2019-03-15 ASSESSMENT — ENCOUNTER SYMPTOMS
RHINORRHEA: 0
PHOTOPHOBIA: 0
ABDOMINAL PAIN: 0
SHORTNESS OF BREATH: 0
NAUSEA: 0
WHEEZING: 0
COUGH: 0
DIARRHEA: 0
BACK PAIN: 0
SORE THROAT: 0
CHEST TIGHTNESS: 0
VOMITING: 0
EYE PAIN: 0
COLOR CHANGE: 0
CONSTIPATION: 0
SINUS PRESSURE: 0
EYE REDNESS: 0

## 2019-03-18 ENCOUNTER — TELEPHONE (OUTPATIENT)
Dept: INTERNAL MEDICINE | Age: 64
End: 2019-03-18

## 2019-03-18 RX ORDER — AMOXICILLIN AND CLAVULANATE POTASSIUM 875; 125 MG/1; MG/1
1 TABLET, FILM COATED ORAL 2 TIMES DAILY
Qty: 20 TABLET | Refills: 0 | Status: SHIPPED | OUTPATIENT
Start: 2019-03-18 | End: 2019-03-28

## 2019-03-18 RX ORDER — ALBUTEROL SULFATE 90 UG/1
2 AEROSOL, METERED RESPIRATORY (INHALATION) 4 TIMES DAILY PRN
Qty: 1 INHALER | Refills: 1 | Status: SHIPPED | OUTPATIENT
Start: 2019-03-18

## 2019-03-18 RX ORDER — METHYLPREDNISOLONE 4 MG/1
TABLET ORAL
Qty: 1 KIT | Refills: 0 | Status: SHIPPED | OUTPATIENT
Start: 2019-03-18 | End: 2019-03-24

## 2019-03-22 RX ORDER — AMOXICILLIN 250 MG/1
250 CAPSULE ORAL 3 TIMES DAILY
Qty: 30 CAPSULE | Refills: 0 | Status: SHIPPED | OUTPATIENT
Start: 2019-03-22 | End: 2019-04-01

## 2019-03-25 ENCOUNTER — TELEPHONE (OUTPATIENT)
Dept: INTERNAL MEDICINE | Age: 64
End: 2019-03-25

## 2019-03-25 RX ORDER — METHYLPREDNISOLONE 4 MG/1
TABLET ORAL
Qty: 1 KIT | Refills: 0 | Status: SHIPPED | OUTPATIENT
Start: 2019-03-25 | End: 2019-03-31

## 2019-03-26 RX ORDER — ATENOLOL 50 MG/1
TABLET ORAL
Qty: 90 TABLET | Refills: 3 | Status: SHIPPED | OUTPATIENT
Start: 2019-03-26

## 2019-04-15 ENCOUNTER — TELEPHONE (OUTPATIENT)
Dept: INTERNAL MEDICINE | Age: 64
End: 2019-04-15

## 2019-04-15 RX ORDER — SIMVASTATIN 20 MG
TABLET ORAL
Qty: 90 TABLET | Refills: 3 | Status: SHIPPED | OUTPATIENT
Start: 2019-04-15

## 2019-04-16 ENCOUNTER — OFFICE VISIT (OUTPATIENT)
Dept: INTERNAL MEDICINE | Age: 64
End: 2019-04-16
Payer: COMMERCIAL

## 2019-04-16 VITALS
HEART RATE: 77 BPM | TEMPERATURE: 96.9 F | BODY MASS INDEX: 29.95 KG/M2 | WEIGHT: 197.6 LBS | DIASTOLIC BLOOD PRESSURE: 100 MMHG | HEIGHT: 68 IN | OXYGEN SATURATION: 98 % | SYSTOLIC BLOOD PRESSURE: 140 MMHG

## 2019-04-16 DIAGNOSIS — F33.40 RECURRENT MAJOR DEPRESSIVE DISORDER, IN REMISSION (HCC): Primary | ICD-10-CM

## 2019-04-16 DIAGNOSIS — R05.3 CHRONIC COUGH: ICD-10-CM

## 2019-04-16 DIAGNOSIS — I10 ESSENTIAL HYPERTENSION: ICD-10-CM

## 2019-04-16 PROCEDURE — 99213 OFFICE O/P EST LOW 20 MIN: CPT | Performed by: PHYSICIAN ASSISTANT

## 2019-04-16 RX ORDER — BENZONATATE 200 MG/1
200 CAPSULE ORAL 3 TIMES DAILY PRN
Qty: 30 CAPSULE | Refills: 1 | Status: SHIPPED | OUTPATIENT
Start: 2019-04-16 | End: 2019-04-23

## 2019-04-16 RX ORDER — ESCITALOPRAM OXALATE 10 MG/1
10 TABLET ORAL DAILY
Qty: 90 TABLET | Refills: 3 | Status: SHIPPED | OUTPATIENT
Start: 2019-04-16

## 2019-04-16 RX ORDER — METHYLPREDNISOLONE 4 MG/1
TABLET ORAL
Qty: 1 KIT | Refills: 0 | Status: SHIPPED | OUTPATIENT
Start: 2019-04-16 | End: 2019-04-22

## 2019-04-16 ASSESSMENT — ENCOUNTER SYMPTOMS
EYE PAIN: 0
ABDOMINAL PAIN: 0
BACK PAIN: 0
VOMITING: 0
PHOTOPHOBIA: 0
CONSTIPATION: 0
WHEEZING: 0
SORE THROAT: 0
CHEST TIGHTNESS: 0
SHORTNESS OF BREATH: 0
SINUS PRESSURE: 0
EYE REDNESS: 0
COLOR CHANGE: 0
DIARRHEA: 0
COUGH: 1
RHINORRHEA: 1
NAUSEA: 0

## 2019-04-16 NOTE — PROGRESS NOTES
[facility] Cort[facility] INTERNAL MEDICINE  [facility] [facility] [address]  Dept: [phone]  Dept Fax: [phone]: [phone]      [facility] INTERNAL MEDICINE  OFFICE NOTE      Chief Complaint   Patient presents with    Cough     off and on for 2 months now        Sp Chow is a 61y.o. year old male who is seen for evaluation of chronic cough on and off for last 2 months. Patient states for last 2 months she's been having cough and congestion runny nose at times. Patient states just cannot seem to get over the cough. Patient has been on antibiotic and steroids. Patient states thinks the steroids helped. Patient also taking Claritin alternating with Allegra for runny nose. Patient denies any fever patient's denies any purulent sputum or drainage. Patient states doesn't really have any GERD. Patient takes Prilosec daily. Patient also taking Mucinex daily. Patient denies any sore throat.     Active Ambulatory Problems     Diagnosis Date Noted    Mixed hyperlipidemia 07/13/2018    Essential hypertension 07/13/2018    Paroxysmal atrial fibrillation ([address] 07/13/2018    Major depressive disorder in remission ([address] 07/13/2018    Vasovagal syncope/neurocardiogenic syncope 07/13/2018    Hypogonadism male 07/13/2018    Prediabetes 07/13/2018     Resolved Ambulatory Problems     Diagnosis Date Noted    No Resolved Ambulatory Problems     Past Medical History:   Diagnosis Date    Essential hypertension 7/13/2018    Hypogonadism male 7/13/2018    Major depressive disorder in remission ([address] 7/13/2018    Mixed hyperlipidemia 7/13/2018    Paroxysmal atrial fibrillation ([address] 7/13/2018    Prediabetes 7/13/2018    Vasovagal syncope 7/13/2018       Past Medical History:   Diagnosis Date    Essential hypertension 7/13/2018    Hypogonadism male 7/13/2018    Major depressive disorder in remission ([address] 7/13/2018    Mixed hyperlipidemia 7/13/2018    Never Used   Substance and Sexual Activity    Alcohol use: Yes     Alcohol/week: 4.2 oz     Types: 7 Shots of liquor per week    Drug use: No    Sexual activity: Yes     Partners: Female     Comment: wife   Lifestyle    Physical activity:     Days per week: Not on file     Minutes per session: Not on file    Stress: Not on file   Relationships    Social connections:     Talks on phone: Not on file     Gets together: Not on file     Attends Yazdanism service: Not on file     Active member of club or organization: Not on file     Attends meetings of clubs or organizations: Not on file     Relationship status: Not on file    Intimate partner violence:     Fear of current or ex partner: Not on file     Emotionally abused: Not on file     Physically abused: Not on file     Forced sexual activity: Not on file   Other Topics Concern    Not on file   Social History Narrative    Not on file       Review of Systems  Review of Systems   Constitutional: Negative for activity change, appetite change, fatigue and unexpected weight change. HENT: Positive for postnasal drip and rhinorrhea. Negative for ear pain, sinus pressure, sneezing and sore throat. Eyes: Negative for photophobia, pain and redness. Respiratory: Positive for cough. Negative for chest tightness, shortness of breath and wheezing. Cardiovascular: Negative for chest pain, palpitations and leg swelling. Gastrointestinal: Negative for abdominal pain, constipation, diarrhea, nausea and vomiting. Genitourinary: Negative for dysuria, frequency, hematuria and urgency. Musculoskeletal: Negative for arthralgias, back pain, gait problem, joint swelling and myalgias. Skin: Negative for color change, pallor and wound. Neurological: Negative for dizziness, speech difficulty, weakness, light-headedness, numbness and headaches. Hematological: Negative for adenopathy. Does not bruise/bleed easily. Psychiatric/Behavioral: Negative for confusion.  The patient is not nervous/anxious. Current Outpatient Medications   Medication Sig Dispense Refill    escitalopram (LEXAPRO) 10 MG tablet Take 1 tablet by mouth daily 90 tablet 3    methylPREDNISolone (MEDROL DOSEPACK) 4 MG tablet Take by mouth. 1 kit 0    benzonatate (TESSALON) 200 MG capsule Take 1 capsule by mouth 3 times daily as needed for Cough 30 capsule 1    simvastatin (ZOCOR) 20 MG tablet TAKE 1 TABLET BY MOUTH ONCE DAILY 90 tablet 3    atenolol (TENORMIN) 50 MG tablet TAKE 1 TABLET BY MOUTH ONCE DAILY 90 tablet 3    albuterol sulfate  (90 Base) MCG/ACT inhaler Inhale 2 puffs into the lungs 4 times daily as needed for Wheezing 1 Inhaler 1    ALPRAZolam (XANAX) 0.25 MG tablet Take 1 tablet by mouth 2 times daily as needed for Sleep or Anxiety for up to 90 days. 180 tablet 0    losartan (COZAAR) 100 MG tablet Take 1 tablet by mouth daily 90 tablet 3     No current facility-administered medications for this visit. BP (!) 140/100   Pulse 77   Temp 96.9 °F (36.1 °C)   Ht 5' 8\" (1.727 m)   Wt 197 lb 9.6 oz (89.6 kg)   SpO2 98%   BMI 30.04 kg/m²     PHYSICAL EXAM  Physical Exam   Constitutional: Vital signs are normal. He appears well-developed and well-nourished. HENT:   Head: Normocephalic and atraumatic. Right Ear: External ear normal.   Left Ear: External ear normal.   Nose: Nose normal.   Mouth/Throat: Oropharynx is clear and moist. No oropharyngeal exudate. Eyes: Pupils are equal, round, and reactive to light. Right eye exhibits no discharge. Left eye exhibits no discharge. Neck: Normal range of motion. No tracheal deviation present. Cardiovascular: Normal rate, regular rhythm and normal heart sounds. Exam reveals no gallop and no friction rub. No murmur heard. Pulmonary/Chest: Effort normal and breath sounds normal. No respiratory distress. He has no wheezes. He has no rales. He exhibits no tenderness. Abdominal: Soft. There is no tenderness.  There is no rebound and no guarding. Musculoskeletal: Normal range of motion. He exhibits no tenderness or deformity. Lymphadenopathy:     He has no cervical adenopathy. Neurological: He is alert. He has normal reflexes. Skin: Skin is warm, dry and intact. No lesion and no rash noted. No erythema. Psychiatric: He has a normal mood and affect. His mood appears not anxious. He does not exhibit a depressed mood. Nursing note and vitals reviewed. ASSESSMENT      ICD-10-CM    1. Recurrent major depressive disorder, in remission ([facility]) F33.40 escitalopram (LEXAPRO) 10 MG tablet   2. Chronic cough R05 XR CHEST STANDARD (2 VW)   3. Essential hypertension I10        PLAN  Patient with some on-and-off coughing. Patient has been on 2 rounds of steroids and antibiotic. Patient states he continues to take antihistamines as directed. Patient states not really coughing anything up. Patient denies any chest pain. Patient states the cough is nonproductive. I discussed with patient that we could try some Tessalon Perles and another steroid and go and get a chest x-ray to make sure nothing else is going on. If patient does not improve consider sending patient to Dr. John Bailey for further evaluation. Patient is also taking Prilosec so I do not think this would probably be caused from GERD. Patient is on antihistamine and not complaining of a lot of sinus drainage or postnasal drip. Patient's blood pressure is a little elevated today. Patient states it usually fairly well controlled on Cozaar and atenolol. Patient will continue to monitor. Patient states needs a refill on his Lexapro. Patient's depression seems to be stable at this time. Patient also takes Xanax for anxiety. Patient follow-up as needed if not improving. Return if symptoms worsen or fail to improve. All questions answered. Patient voices understanding and agrees to plans along with risks and benefits of plan.  Patient is instructed to continue prior medications, diet, and exercise plans as instructed. Patient agrees to follow up as instructions, sooner if needed, or to go to ER if condition becomes emergent. Additional Instructions: As always, patient is advised to bring in medication bottles in order to correctly reconcile with our current list.      Nabor Garcia PA-C    EMR Dragon/transcription disclaimer: Much of this encounter note is electronic transcription/translation of spoken language to printed text. The electronictranslation of spoken language may be erroneous, or at times, nonsensical words or phrases may be inadvertently transcribed.  Although I have reviewed the note for such errors, some may still exist.

## 2019-09-05 DIAGNOSIS — R55 VASOVAGAL SYNCOPE: Chronic | ICD-10-CM

## 2019-09-05 DIAGNOSIS — F33.40 RECURRENT MAJOR DEPRESSIVE DISORDER, IN REMISSION (HCC): Chronic | ICD-10-CM

## 2019-09-05 RX ORDER — ALPRAZOLAM 0.25 MG/1
0.25 TABLET ORAL 2 TIMES DAILY PRN
Qty: 180 TABLET | Refills: 0 | Status: SHIPPED | OUTPATIENT
Start: 2019-09-05 | End: 2020-02-07 | Stop reason: SDUPTHER

## 2019-09-25 ENCOUNTER — OFFICE VISIT (OUTPATIENT)
Dept: OTOLARYNGOLOGY | Facility: CLINIC | Age: 64
End: 2019-09-25

## 2019-09-25 VITALS
TEMPERATURE: 98.5 F | BODY MASS INDEX: 30.92 KG/M2 | HEIGHT: 68 IN | RESPIRATION RATE: 20 BRPM | OXYGEN SATURATION: 100 % | WEIGHT: 204 LBS | HEART RATE: 98 BPM

## 2019-09-25 DIAGNOSIS — L81.8 ATYPICAL MELANOCYTIC HYPERPLASIA: Primary | ICD-10-CM

## 2019-09-25 DIAGNOSIS — D22.9 BENIGN NEVUS: ICD-10-CM

## 2019-09-25 PROCEDURE — 99203 OFFICE O/P NEW LOW 30 MIN: CPT | Performed by: OTOLARYNGOLOGY

## 2019-09-25 RX ORDER — ALPRAZOLAM 0.25 MG/1
0.25 TABLET ORAL
COMMUNITY
Start: 2019-09-05 | End: 2019-10-29 | Stop reason: HOSPADM

## 2019-09-25 RX ORDER — ATENOLOL 50 MG/1
TABLET ORAL
COMMUNITY
Start: 2019-03-26 | End: 2020-02-13 | Stop reason: SDUPTHER

## 2019-09-25 RX ORDER — ALBUTEROL SULFATE 90 UG/1
2 AEROSOL, METERED RESPIRATORY (INHALATION) DAILY PRN
COMMUNITY
Start: 2019-03-18 | End: 2021-12-30 | Stop reason: SDUPTHER

## 2019-09-25 RX ORDER — SIMVASTATIN 20 MG
TABLET ORAL
COMMUNITY
Start: 2019-04-15 | End: 2020-02-13 | Stop reason: SDUPTHER

## 2019-09-25 RX ORDER — ALPRAZOLAM 0.25 MG/1
TABLET ORAL
Refills: 0 | COMMUNITY
Start: 2019-09-05 | End: 2020-02-13 | Stop reason: SDUPTHER

## 2019-09-25 RX ORDER — ESCITALOPRAM OXALATE 10 MG/1
10 TABLET ORAL DAILY
COMMUNITY
Start: 2019-04-16 | End: 2020-02-13 | Stop reason: SDUPTHER

## 2019-09-25 RX ORDER — LOSARTAN POTASSIUM 100 MG/1
100 TABLET ORAL DAILY
COMMUNITY
Start: 2019-03-15 | End: 2020-02-13 | Stop reason: SDUPTHER

## 2019-09-25 NOTE — PROGRESS NOTES
CC:   Chief Complaint   Patient presents with   • Melanoma     left cheek       HPI: Israel Campos is a 64 y.o. male who reports a skin lesion on the left cheek.  This lesion has been present for several  years.  The lesion has changed. He reports this lesion has enlarged and become more dark.  Nothing makes the lesion better or worse.  A biopsy has been performed revealing solar lentigo associated with atypical lentiginous melanocytic hyperplasia - melanoma in situ cannot be excluded..    Prior history of skin cancer: none    Dermatologist: Eva Ley MD    He also complains of several benign moles localized to the right nasal tip, right forehead, and left chin.  The moles on the forehead and chin have been present for many years.  They are not changing.  The mole on the right nasal tip has been present for the last year and is enlarging.  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    PFSH:  Past Medical History:   Diagnosis Date   • Atrial fibrillation (CMS/HCC)    • Hypertension      History reviewed. No pertinent surgical history.  History reviewed. No pertinent family history.  Social History     Tobacco Use   • Smoking status: Never Smoker   • Smokeless tobacco: Never Used   Substance Use Topics   • Alcohol use: Yes   • Drug use: No     Allergies:  Shellfish-derived products and Azithromycin    Current Outpatient Medications:   •  albuterol sulfate  (90 Base) MCG/ACT inhaler, Inhale 2 puffs., Disp: , Rfl:   •  ALPRAZolam (XANAX) 0.25 MG tablet, TAKE 1 TABLET BY MOUTH TWICE DAILY AS NEEDED FOR SLEEP OR ANXIETY FOR UP TO 90 DAYS, Disp: , Rfl: 0  •  ALPRAZolam (XANAX) 0.25 MG tablet, Take 0.25 mg by mouth., Disp: , Rfl:   •  atenolol (TENORMIN) 50 MG tablet, TAKE 1 TABLET BY MOUTH ONCE DAILY, Disp: , Rfl:   •  escitalopram (LEXAPRO) 10 MG tablet, Take 10 mg by mouth., Disp: , Rfl:   •  losartan (COZAAR) 100 MG tablet, Take 100 mg by mouth., Disp: , Rfl:   •  simvastatin  "(ZOCOR) 20 MG tablet, TAKE 1 TABLET BY MOUTH ONCE DAILY, Disp: , Rfl:     ROS:  Review of Systems   Constitutional: Negative for activity change, appetite change, chills, fatigue, fever and unexpected weight change.   HENT: Negative for congestion, dental problem, facial swelling and nosebleeds.    Eyes: Negative for discharge and redness.   Musculoskeletal: Negative for neck pain.   Skin: Negative for color change, pallor and rash.   Neurological: Positive for facial asymmetry.   Hematological: Negative for adenopathy. Bruises/bleeds easily.   Psychiatric/Behavioral: The patient is nervous/anxious.        PE:  Pulse 98   Temp 98.5 °F (36.9 °C)   Resp 20   Ht 172.7 cm (68\")   Wt 92.5 kg (204 lb)   SpO2 100%   BMI 31.02 kg/m²   Physical Exam   Constitutional: He is oriented to person, place, and time. He appears well-developed and well-nourished. He is cooperative. No distress.   HENT:   Head: Normocephalic and atraumatic.       Right Ear: External ear normal.   Left Ear: External ear normal.   Nose: Nose normal.   Mouth/Throat: Uvula is midline, oropharynx is clear and moist and mucous membranes are normal.       Eyes: Conjunctivae and EOM are normal. Pupils are equal, round, and reactive to light. Right eye exhibits no discharge. Left eye exhibits no discharge. No scleral icterus.   Neck: Normal range of motion. Neck supple. No JVD present. No tracheal deviation present. No thyromegaly present.   Cardiovascular: Normal rate, regular rhythm and normal heart sounds.   Pulmonary/Chest: Effort normal and breath sounds normal. No stridor.   Musculoskeletal: Normal range of motion. He exhibits no edema or deformity.   Lymphadenopathy:     He has no cervical adenopathy.   Neurological: He is alert and oriented to person, place, and time. He has normal strength. No cranial nerve deficit. Coordination normal.   Skin: Skin is warm and dry. No rash noted. He is not diaphoretic. No erythema. No pallor.   Psychiatric: He " has a normal mood and affect. His speech is normal and behavior is normal. Judgment and thought content normal. Cognition and memory are normal.   Nursing note and vitals reviewed.        Data review:      Assessment:  1. Atypical melanocytic hyperplasia    2. Benign nevus        Plan:    1. We have discussed excision of the lesion with narrow margins (1-2 mm with or without staged excision) versus excision of the lesion with wider facial melanoma in-situ margins (5 mm). The patient prefers to treat this lesion as an melanoma in-situ and proceed with excision of the lesion with wide margins and linear closure in the operating room under anesthesia.  He is very nervous about procedures and would like to decrease his chance of needing a secondary procedure.   2. The risks and benefits of my recommendations, as well as other treatment options were discussed with the patient today.   3. Discussion of skin lesion. Discussed risks, benefits, alternatives, and possible complications of excision of the skin lesion with reconstruction utilizing local tissue rearrangement, full-thickness skin grafting, or local interpolated flaps. Risks include, but are not limited too: bleeding, infection, hematoma, recurrence, need for additional procedures, flap failure, cosmetic deformity. Patient understands risks and would like to proceed with surgery.     Excision of the benign nevi would be likely be considered cosmetic.  We will wait to see how he heals following this procedure prior to scheduling excision of the benign nevi.    Return for 1 week postoperatively.      Giancarlo Lacy MD   09/25/2019  10:35 AM

## 2019-10-07 PROBLEM — L81.8 ATYPICAL MELANOCYTIC HYPERPLASIA: Status: ACTIVE | Noted: 2019-10-07

## 2019-10-22 ENCOUNTER — APPOINTMENT (OUTPATIENT)
Dept: PREADMISSION TESTING | Facility: HOSPITAL | Age: 64
End: 2019-10-22

## 2019-10-22 VITALS
OXYGEN SATURATION: 98 % | WEIGHT: 205.03 LBS | DIASTOLIC BLOOD PRESSURE: 88 MMHG | HEART RATE: 96 BPM | RESPIRATION RATE: 18 BRPM | SYSTOLIC BLOOD PRESSURE: 193 MMHG | HEIGHT: 69 IN | BODY MASS INDEX: 30.37 KG/M2

## 2019-10-22 LAB
ANION GAP SERPL CALCULATED.3IONS-SCNC: 11 MMOL/L (ref 5–15)
BUN BLD-MCNC: 12 MG/DL (ref 8–23)
BUN/CREAT SERPL: 13.6 (ref 7–25)
CALCIUM SPEC-SCNC: 9.5 MG/DL (ref 8.6–10.5)
CHLORIDE SERPL-SCNC: 101 MMOL/L (ref 98–107)
CO2 SERPL-SCNC: 28 MMOL/L (ref 22–29)
CREAT BLD-MCNC: 0.88 MG/DL (ref 0.76–1.27)
DEPRECATED RDW RBC AUTO: 38.2 FL (ref 37–54)
ERYTHROCYTE [DISTWIDTH] IN BLOOD BY AUTOMATED COUNT: 12.2 % (ref 12.3–15.4)
GFR SERPL CREATININE-BSD FRML MDRD: 87 ML/MIN/1.73
GLUCOSE BLD-MCNC: 100 MG/DL (ref 65–99)
HCT VFR BLD AUTO: 41.8 % (ref 37.5–51)
HGB BLD-MCNC: 13.8 G/DL (ref 13–17.7)
MCH RBC QN AUTO: 28.4 PG (ref 26.6–33)
MCHC RBC AUTO-ENTMCNC: 33 G/DL (ref 31.5–35.7)
MCV RBC AUTO: 86 FL (ref 79–97)
PLATELET # BLD AUTO: 221 10*3/MM3 (ref 140–450)
PMV BLD AUTO: 11 FL (ref 6–12)
POTASSIUM BLD-SCNC: 3.8 MMOL/L (ref 3.5–5.2)
RBC # BLD AUTO: 4.86 10*6/MM3 (ref 4.14–5.8)
SODIUM BLD-SCNC: 140 MMOL/L (ref 136–145)
WBC NRBC COR # BLD: 8.09 10*3/MM3 (ref 3.4–10.8)

## 2019-10-22 PROCEDURE — 93005 ELECTROCARDIOGRAM TRACING: CPT

## 2019-10-22 PROCEDURE — 36415 COLL VENOUS BLD VENIPUNCTURE: CPT

## 2019-10-22 PROCEDURE — 85027 COMPLETE CBC AUTOMATED: CPT | Performed by: OTOLARYNGOLOGY

## 2019-10-22 PROCEDURE — 93010 ELECTROCARDIOGRAM REPORT: CPT | Performed by: INTERNAL MEDICINE

## 2019-10-22 PROCEDURE — 80048 BASIC METABOLIC PNL TOTAL CA: CPT | Performed by: OTOLARYNGOLOGY

## 2019-10-22 RX ORDER — OMEPRAZOLE 20 MG/1
20 CAPSULE, DELAYED RELEASE ORAL DAILY
COMMUNITY
End: 2020-02-13 | Stop reason: SDUPTHER

## 2019-10-22 RX ORDER — FEXOFENADINE HCL 180 MG/1
180 TABLET ORAL DAILY
COMMUNITY

## 2019-10-22 NOTE — DISCHARGE INSTRUCTIONS
DAY OF SURGERY INSTRUCTIONS        YOUR SURGEON: RANDAL PIERRE    PROCEDURE: EXCISION OF SKIN LESION OF THE LEFT CHEEK WITH LINEAR CLOSURE    DATE OF SURGERY: 10/29/2019    ARRIVAL TIME: AS DIRECTED BY OFFICE    YOU MAY TAKE THE FOLLOWING MEDICATION(S) THE MORNING OF SURGERY WITH A SIP OF WATER: ATENOLOL AND XANAX    ALL OTHER HOME MEDICATIONS CHECK WITH YOUR DOCTOR              MANAGING PAIN AFTER SURGERY    We know you are probably wondering what your pain will be like after surgery.  Following surgery it is unrealistic to expect you will not have pain.   Pain is how our bodies let us know that something is wrong or cautions us to be careful.  That said, our goal is to make your pain tolerable.    Methods we may use to treat your pain include (oral or IV medications, PCAs, epidurals, nerve blocks, etc.)   While some procedures require IV pain medications for a short time after surgery, transitioning to pain medications by mouth allows for better management of pain.   Your nurse will encourage you to take oral pain medications whenever possible.  IV medications work almost immediately, but only last a short while.  Taking medications by mouth allows for a more constant level of medication in your blood stream for a longer period of time.      Once your pain is out of control it is harder to get back under control.  It is important you are aware when your next dose of pain medication is due.  If you are admitted, your nurse may write the time of your next dose on the white board in your room to help you remember.      We are interested in your pain and encourage you to inform us about aggravating factors during your visit.   Many times a simple repositioning every few hours can make a big difference.    If your physician says it is okay, do not let your pain prevent you from getting out of bed. Be sure to call your nurse for assistance prior to getting up so you do not fall.      Before surgery, please decide your  tolerable pain goal.  These faces help describe the pain ratings we use on a 0-10 scale.   Be prepared to tell us your goal and whether or not you take pain or anxiety medications at home.      BEFORE YOU COME TO THE HOSPITAL  (Pre-op instructions)  • Do not eat, drink, smoke or chew gum after midnight the night before surgery.  This also includes no mints.  • Morning of surgery take only the medicines you have been instructed with a sip of water unless otherwise instructed  by your physician.  • Do not shave, wear makeup or dark nail polish.  • Remove all jewelry including rings.  • Leave anything you consider valuable at home.  • Leave your suitcase in the car until after your surgery.  • Bring the following with you if applicable:  o Picture ID and insurance, Medicare or Medicaid cards  o Co-pay/deductible required by insurance (cash, check, credit card)  o Copy of advance directive, living will or power-of- documents if not brought to PAT  o CPAP or BIPAP mask and tubing  o Relaxation aids ( book, magazine), etc.  o Hearing aids                                 ON THE DAY OF SURGERY  · On the day of surgery check in at registration located at the main entrance of the hospital.   ? You will be registered and given a beeper with instructions where to wait in the main lobby.  ? When your beeper lights up and vibrates a member of the Outpatient Surgery staff will meet you at the double doors under the stair steps and escort you to your preoperative room.   · You may have cloth compression devices placed on your legs. These help to prevent blood clots and reduce swelling in your legs.  · An IV may be inserted into one of your veins.  · In the operating room, you may be given one or more of the following:  ? A medicine to help you relax (sedative).  ? A medicine to numb the area (local anesthetic).  ? A medicine to make you fall asleep (general anesthetic).  ? A medicine that is injected into an area of your  "body to numb everything below the injection site (regional anesthetic).  · Your surgical site will be marked or identified.  · You may be given an antibiotic through your IV to help prevent infection.  Contact a health care provider if you:  · Develop a fever of more than 100.4°F (38°C) or other feelings of illness during the 48 hours before your surgery.  · Have symptoms that get worse.  Have questions or concerns about your surgery    General Anesthesia/Surgery, Adult  General anesthesia is the use of medicines to make a person \"go to sleep\" (unconscious) for a medical procedure. General anesthesia must be used for certain procedures, and is often recommended for procedures that:  · Last a long time.  · Require you to be still or in an unusual position.  · Are major and can cause blood loss.  The medicines used for general anesthesia are called general anesthetics. As well as making you unconscious for a certain amount of time, these medicines:  · Prevent pain.  · Control your blood pressure.  · Relax your muscles.  Tell a health care provider about:  · Any allergies you have.  · All medicines you are taking, including vitamins, herbs, eye drops, creams, and over-the-counter medicines.  · Any problems you or family members have had with anesthetic medicines.  · Types of anesthetics you have had in the past.  · Any blood disorders you have.  · Any surgeries you have had.  · Any medical conditions you have.  · Any recent upper respiratory, chest, or ear infections.  · Any history of:  ? Heart or lung conditions, such as heart failure, sleep apnea, asthma, or chronic obstructive pulmonary disease (COPD).  ?  service.  ? Depression or anxiety.  · Any tobacco or drug use, including marijuana or alcohol use.  · Whether you are pregnant or may be pregnant.  What are the risks?  Generally, this is a safe procedure. However, problems may occur, including:  · Allergic reaction.  · Lung and heart " problems.  · Inhaling food or liquid from the stomach into the lungs (aspiration).  · Nerve injury.  · Air in the bloodstream, which can lead to stroke.  · Extreme agitation or confusion (delirium) when you wake up from the anesthetic.  · Waking up during your procedure and being unable to move. This is rare.  These problems are more likely to develop if you are having a major surgery or if you have an advanced or serious medical condition. You can prevent some of these complications by answering all of your health care provider's questions thoroughly and by following all instructions before your procedure.  General anesthesia can cause side effects, including:  · Nausea or vomiting.  · A sore throat from the breathing tube.  · Hoarseness.  · Wheezing or coughing.  · Shaking chills.  · Tiredness.  · Body aches.  · Anxiety.  · Sleepiness or drowsiness.  · Confusion or agitation.  RISKS AND COMPLICATIONS OF SURGERY  Your health care provider will discuss possible risks and complications with you before surgery. Common risks and complications include:    · Problems due to the use of anesthetics.  · Blood loss and replacement (does not apply to minor surgical procedures).  · Temporary increase in pain due to surgery.  · Uncorrected pain or problems that the surgery was meant to correct.  · Infection.  · New damage.    What happens before the procedure?    Medicines  Ask your health care provider about:  · Changing or stopping your regular medicines. This is especially important if you are taking diabetes medicines or blood thinners.  · Taking medicines such as aspirin and ibuprofen. These medicines can thin your blood. Do not take these medicines unless your health care provider tells you to take them.  · Taking over-the-counter medicines, vitamins, herbs, and supplements. Do not take these during the week before your procedure unless your health care provider approves them.  General instructions  · Starting 3-6 weeks  before the procedure, do not use any products that contain nicotine or tobacco, such as cigarettes and e-cigarettes. If you need help quitting, ask your health care provider.  · If you brush your teeth on the morning of the procedure, make sure to spit out all of the toothpaste.  · Tell your health care provider if you become ill or develop a cold, cough, or fever.  · If instructed by your health care provider, bring your sleep apnea device with you on the day of your surgery (if applicable).  · Ask your health care provider if you will be going home the same day, the following day, or after a longer hospital stay.  ? Plan to have someone take you home from the hospital or clinic.  ? Plan to have a responsible adult care for you for at least 24 hours after you leave the hospital or clinic. This is important.  What happens during the procedure?  · You will be given anesthetics through both of the following:  ? A mask placed over your nose and mouth.  ? An IV in one of your veins.  · You may receive a medicine to help you relax (sedative).  · After you are unconscious, a breathing tube may be inserted down your throat to help you breathe. This will be removed before you wake up.  · An anesthesia specialist will stay with you throughout your procedure. He or she will:  ? Keep you comfortable and safe by continuing to give you medicines and adjusting the amount of medicine that you get.  ? Monitor your blood pressure, pulse, and oxygen levels to make sure that the anesthetics do not cause any problems.  The procedure may vary among health care providers and hospitals.  What happens after the procedure?  · Your blood pressure, temperature, heart rate, breathing rate, and blood oxygen level will be monitored until the medicines you were given have worn off.  · You will wake up in a recovery area. You may wake up slowly.  · If you feel anxious or agitated, you may be given medicine to help you calm down.  · If you will be  going home the same day, your health care provider may check to make sure you can walk, drink, and urinate.  · Your health care provider will treat any pain or side effects you have before you go home.  · Do not drive for 24 hours if you were given a sedative.  Summary  · General anesthesia is used to keep you still and prevent pain during a procedure.  · It is important to tell your healthcare provider about your medical history and any surgeries you have had, and previous experience with anesthesia.  · Follow your healthcare provider’s instructions about when to stop eating, drinking, or taking certain medicines before your procedure.  · Plan to have someone take you home from the hospital or clinic.  This information is not intended to replace advice given to you by your health care provider. Make sure you discuss any questions you have with your health care provider.  Document Released: 03/26/2009 Document Revised: 08/03/2018 Document Reviewed: 08/03/2018  [employer] Interactive Patient Education © 2019 [employer]      Fall Prevention in Hospitals, Adult  As a hospital patient, your condition and the treatments you receive can increase your risk for falls. Some additional risk factors for falls in a hospital include:  · Being in an unfamiliar environment.  · Being on bed rest.  · Your surgery.  · Taking certain medicines.  · Your tubing requirements, such as intravenous (IV) therapy or catheters.  It is important that you learn how to decrease fall risks while at the hospital. Below are important tips that can help prevent falls.  SAFETY TIPS FOR PREVENTING FALLS  Talk about your risk of falling.  · Ask your health care provider why you are at risk for falling. Is it your medicine, illness, tubing placement, or something else?  · Make a plan with your health care provider to keep you safe from falls.  · Ask your health care provider or pharmacist about side effects of your medicines. Some medicines can make you  dizzy or affect your coordination.  Ask for help.  · Ask for help before getting out of bed. You may need to press your call button.  · Ask for assistance in getting safely to the toilet.  · Ask for a walker or cane to be put at your bedside. Ask that most of the side rails on your bed be placed up before your health care provider leaves the room.  · Ask family or friends to sit with you.  · Ask for things that are out of your reach, such as your glasses, hearing aids, telephone, bedside table, or call button.  Follow these tips to avoid falling:  · Stay lying or seated, rather than standing, while waiting for help.  · Wear rubber-soled slippers or shoes whenever you walk in the hospital.  · Avoid quick, sudden movements.  ¨ Change positions slowly.  ¨ Sit on the side of your bed before standing.  ¨ Stand up slowly and wait before you start to walk.  · Let your health care provider know if there is a spill on the floor.  · Pay careful attention to the medical equipment, electrical cords, and tubes around you.  · When you need help, use your call button by your bed or in the bathroom. Wait for one of your health care providers to help you.  · If you feel dizzy or unsure of your footing, return to bed and wait for assistance.  · Avoid being distracted by the TV, telephone, or another person in your room.  · Do not lean or support yourself on rolling objects, such as IV poles or bedside tables.     This information is not intended to replace advice given to you by your health care provider. Make sure you discuss any questions you have with your health care provider.     Document Released: 12/15/2001 Document Revised: 01/08/2016 Document Reviewed: 08/25/2013  [employer] Interactive Patient Education ©2016 [employer]       Surgical Site Infections FAQs  What is a Surgical Site Infection (SSI)?  A surgical site infection is an infection that occurs after surgery in the part of the body where the surgery took place. Most  patients who have surgery do not develop an infection. However, infections develop in about 1 to 3 out of every 100 patients who have surgery.  Some of the common symptoms of a surgical site infection are:  · Redness and pain around the area where you had surgery  · Drainage of cloudy fluid from your surgical wound  · Fever  Can SSIs be treated?  Yes. Most surgical site infections can be treated with antibiotics. The antibiotic given to you depends on the bacteria (germs) causing the infection. Sometimes patients with SSIs also need another surgery to treat the infection.  What are some of the things that hospitals are doing to prevent SSIs?  To prevent SSIs, doctors, nurses, and other healthcare providers:  · Clean their hands and arms up to their elbows with an antiseptic agent just before the surgery.  · Clean their hands with soap and water or an alcohol-based hand rub before and after caring for each patient.  · May remove some of your hair immediately before your surgery using electric clippers if the hair is in the same area where the procedure will occur. They should not shave you with a razor.  · Wear special hair covers, masks, gowns, and gloves during surgery to keep the surgery area clean.  · Give you antibiotics before your surgery starts. In most cases, you should get antibiotics within 60 minutes before the surgery starts and the antibiotics should be stopped within 24 hours after surgery.  · Clean the skin at the site of your surgery with a special soap that kills germs.  What can I do to help prevent SSIs?  Before your surgery:  · Tell your doctor about other medical problems you may have. Health problems such as allergies, diabetes, and obesity could affect your surgery and your treatment.  · Quit smoking. Patients who smoke get more infections. Talk to your doctor about how you can quit before your surgery.  · Do not shave near where you will have surgery. Shaving with a razor can irritate your  skin and make it easier to develop an infection.  At the time of your surgery:  · Speak up if someone tries to shave you with a razor before surgery. Ask why you need to be shaved and talk with your surgeon if you have any concerns.  · Ask if you will get antibiotics before surgery.  After your surgery:  · Make sure that your healthcare providers clean their hands before examining you, either with soap and water or an alcohol-based hand rub.  · If you do not see your providers clean their hands, please ask them to do so.  · Family and friends who visit you should not touch the surgical wound or dressings.  · Family and friends should clean their hands with soap and water or an alcohol-based hand rub before and after visiting you. If you do not see them clean their hands, ask them to clean their hands.  What do I need to do when I go home from the hospital?  · Before you go home, your doctor or nurse should explain everything you need to know about taking care of your wound. Make sure you understand how to care for your wound before you leave the hospital.  · Always clean your hands before and after caring for your wound.  · Before you go home, make sure you know who to contact if you have questions or problems after you get home.  · If you have any symptoms of an infection, such as redness and pain at the surgery site, drainage, or fever, call your doctor immediately.  If you have additional questions, please ask your doctor or nurse.  Developed and co-sponsored by The Society for Healthcare Epidemiology of [place] (SHEA); Infectious Diseases Society of [place] (IDSA); American Hospital Association; Association for Professionals in Infection Control and Epidemiology (APIC); Centers for Disease Control and Prevention (CDC); and The Joint Commission.     This information is not intended to replace advice given to you by your health care provider. Make sure you discuss any questions you have with your health care  provider.     Document Released: 12/23/2014 Document Revised: 01/08/2016 Document Reviewed: 03/02/2016  [employer] Interactive Patient Education ©2016 [employer]     PATIENT/FAMILY/RESPONSIBLE PARTY VERBALIZES UNDERSTANDING OF ABOVE EDUCATION.  COPY OF PAIN SCALE GIVEN AND REVIEWED WITH VERBALIZED UNDERSTANDING.

## 2019-10-29 ENCOUNTER — HOSPITAL ENCOUNTER (OUTPATIENT)
Facility: HOSPITAL | Age: 64
Setting detail: HOSPITAL OUTPATIENT SURGERY
Discharge: HOME | End: 2019-10-29
Attending: OTOLARYNGOLOGY | Admitting: OTOLARYNGOLOGY

## 2019-10-29 ENCOUNTER — ANESTHESIA (OUTPATIENT)
Dept: PERIOP | Facility: HOSPITAL | Age: 64
End: 2019-10-29

## 2019-10-29 ENCOUNTER — ANESTHESIA EVENT (OUTPATIENT)
Dept: PERIOP | Facility: HOSPITAL | Age: 64
End: 2019-10-29

## 2019-10-29 VITALS
TEMPERATURE: 98 F | HEART RATE: 87 BPM | OXYGEN SATURATION: 95 % | DIASTOLIC BLOOD PRESSURE: 86 MMHG | RESPIRATION RATE: 18 BRPM | SYSTOLIC BLOOD PRESSURE: 149 MMHG

## 2019-10-29 DIAGNOSIS — L81.8 ATYPICAL MELANOCYTIC HYPERPLASIA: ICD-10-CM

## 2019-10-29 PROCEDURE — 13133 CMPLX RPR F/C/C/M/N/AX/G/H/F: CPT | Performed by: OTOLARYNGOLOGY

## 2019-10-29 PROCEDURE — 13132 CMPLX RPR F/C/C/M/N/AX/G/H/F: CPT | Performed by: OTOLARYNGOLOGY

## 2019-10-29 PROCEDURE — 25010000002 MIDAZOLAM PER 1 MG: Performed by: ANESTHESIOLOGY

## 2019-10-29 PROCEDURE — 25010000002 DEXAMETHASONE PER 1 MG: Performed by: ANESTHESIOLOGY

## 2019-10-29 PROCEDURE — 11646 EXC F/E/E/N/L MAL+MRG >4 CM: CPT | Performed by: OTOLARYNGOLOGY

## 2019-10-29 PROCEDURE — 25010000002 DEXAMETHASONE PER 1 MG: Performed by: NURSE ANESTHETIST, CERTIFIED REGISTERED

## 2019-10-29 PROCEDURE — 25010000002 PROPOFOL 10 MG/ML EMULSION: Performed by: NURSE ANESTHETIST, CERTIFIED REGISTERED

## 2019-10-29 PROCEDURE — 25010000002 ONDANSETRON PER 1 MG: Performed by: NURSE ANESTHETIST, CERTIFIED REGISTERED

## 2019-10-29 PROCEDURE — 25010000002 FENTANYL CITRATE (PF) 100 MCG/2ML SOLUTION: Performed by: NURSE ANESTHETIST, CERTIFIED REGISTERED

## 2019-10-29 PROCEDURE — S0260 H&P FOR SURGERY: HCPCS | Performed by: OTOLARYNGOLOGY

## 2019-10-29 PROCEDURE — 88305 TISSUE EXAM BY PATHOLOGIST: CPT | Performed by: OTOLARYNGOLOGY

## 2019-10-29 RX ORDER — MIDAZOLAM HYDROCHLORIDE 1 MG/ML
1 INJECTION INTRAMUSCULAR; INTRAVENOUS
Status: DISCONTINUED | OUTPATIENT
Start: 2019-10-29 | End: 2019-10-29 | Stop reason: HOSPADM

## 2019-10-29 RX ORDER — SODIUM CHLORIDE 0.9 % (FLUSH) 0.9 %
3 SYRINGE (ML) INJECTION AS NEEDED
Status: DISCONTINUED | OUTPATIENT
Start: 2019-10-29 | End: 2019-10-29 | Stop reason: HOSPADM

## 2019-10-29 RX ORDER — IPRATROPIUM BROMIDE AND ALBUTEROL SULFATE 2.5; .5 MG/3ML; MG/3ML
3 SOLUTION RESPIRATORY (INHALATION) ONCE AS NEEDED
Status: DISCONTINUED | OUTPATIENT
Start: 2019-10-29 | End: 2019-10-29 | Stop reason: HOSPADM

## 2019-10-29 RX ORDER — EPHEDRINE SULFATE 50 MG/ML
INJECTION INTRAVENOUS AS NEEDED
Status: DISCONTINUED | OUTPATIENT
Start: 2019-10-29 | End: 2019-10-29 | Stop reason: SURG

## 2019-10-29 RX ORDER — ONDANSETRON 2 MG/ML
INJECTION INTRAMUSCULAR; INTRAVENOUS AS NEEDED
Status: DISCONTINUED | OUTPATIENT
Start: 2019-10-29 | End: 2019-10-29 | Stop reason: SURG

## 2019-10-29 RX ORDER — DEXAMETHASONE SODIUM PHOSPHATE 4 MG/ML
INJECTION, SOLUTION INTRA-ARTICULAR; INTRALESIONAL; INTRAMUSCULAR; INTRAVENOUS; SOFT TISSUE AS NEEDED
Status: DISCONTINUED | OUTPATIENT
Start: 2019-10-29 | End: 2019-10-29 | Stop reason: SURG

## 2019-10-29 RX ORDER — GINSENG 100 MG
CAPSULE ORAL AS NEEDED
Status: DISCONTINUED | OUTPATIENT
Start: 2019-10-29 | End: 2019-10-29 | Stop reason: HOSPADM

## 2019-10-29 RX ORDER — SODIUM CHLORIDE, SODIUM LACTATE, POTASSIUM CHLORIDE, CALCIUM CHLORIDE 600; 310; 30; 20 MG/100ML; MG/100ML; MG/100ML; MG/100ML
100 INJECTION, SOLUTION INTRAVENOUS CONTINUOUS
Status: DISCONTINUED | OUTPATIENT
Start: 2019-10-29 | End: 2019-10-29 | Stop reason: HOSPADM

## 2019-10-29 RX ORDER — OXYCODONE AND ACETAMINOPHEN 7.5; 325 MG/1; MG/1
2 TABLET ORAL EVERY 4 HOURS PRN
Status: DISCONTINUED | OUTPATIENT
Start: 2019-10-29 | End: 2019-10-29 | Stop reason: HOSPADM

## 2019-10-29 RX ORDER — ACETAMINOPHEN 500 MG
1000 TABLET ORAL ONCE
Status: COMPLETED | OUTPATIENT
Start: 2019-10-29 | End: 2019-10-29

## 2019-10-29 RX ORDER — SODIUM CHLORIDE 0.9 % (FLUSH) 0.9 %
3-10 SYRINGE (ML) INJECTION AS NEEDED
Status: DISCONTINUED | OUTPATIENT
Start: 2019-10-29 | End: 2019-10-29 | Stop reason: HOSPADM

## 2019-10-29 RX ORDER — LIDOCAINE HYDROCHLORIDE AND EPINEPHRINE 10; 10 MG/ML; UG/ML
INJECTION, SOLUTION INFILTRATION; PERINEURAL AS NEEDED
Status: DISCONTINUED | OUTPATIENT
Start: 2019-10-29 | End: 2019-10-29 | Stop reason: HOSPADM

## 2019-10-29 RX ORDER — METOCLOPRAMIDE HYDROCHLORIDE 5 MG/ML
5 INJECTION INTRAMUSCULAR; INTRAVENOUS
Status: DISCONTINUED | OUTPATIENT
Start: 2019-10-29 | End: 2019-10-29 | Stop reason: HOSPADM

## 2019-10-29 RX ORDER — FENTANYL CITRATE 50 UG/ML
25 INJECTION, SOLUTION INTRAMUSCULAR; INTRAVENOUS AS NEEDED
Status: DISCONTINUED | OUTPATIENT
Start: 2019-10-29 | End: 2019-10-29 | Stop reason: HOSPADM

## 2019-10-29 RX ORDER — SODIUM CHLORIDE 0.9 % (FLUSH) 0.9 %
3 SYRINGE (ML) INJECTION EVERY 12 HOURS SCHEDULED
Status: DISCONTINUED | OUTPATIENT
Start: 2019-10-29 | End: 2019-10-29 | Stop reason: HOSPADM

## 2019-10-29 RX ORDER — HYDROCODONE BITARTRATE AND ACETAMINOPHEN 7.5; 325 MG/1; MG/1
1 TABLET ORAL ONCE AS NEEDED
Status: DISCONTINUED | OUTPATIENT
Start: 2019-10-29 | End: 2019-10-29 | Stop reason: HOSPADM

## 2019-10-29 RX ORDER — LABETALOL HYDROCHLORIDE 5 MG/ML
5 INJECTION, SOLUTION INTRAVENOUS
Status: DISCONTINUED | OUTPATIENT
Start: 2019-10-29 | End: 2019-10-29 | Stop reason: HOSPADM

## 2019-10-29 RX ORDER — GUAIFENESIN 600 MG/1
1200 TABLET, EXTENDED RELEASE ORAL DAILY
COMMUNITY

## 2019-10-29 RX ORDER — HYDROCODONE BITARTRATE AND ACETAMINOPHEN 7.5; 325 MG/1; MG/1
1 TABLET ORAL EVERY 4 HOURS PRN
Qty: 18 TABLET | Refills: 0 | Status: SHIPPED | OUTPATIENT
Start: 2019-10-29 | End: 2019-11-01

## 2019-10-29 RX ORDER — NALOXONE HCL 0.4 MG/ML
0.4 VIAL (ML) INJECTION AS NEEDED
Status: DISCONTINUED | OUTPATIENT
Start: 2019-10-29 | End: 2019-10-29 | Stop reason: HOSPADM

## 2019-10-29 RX ORDER — MIDAZOLAM HYDROCHLORIDE 1 MG/ML
2 INJECTION INTRAMUSCULAR; INTRAVENOUS
Status: DISCONTINUED | OUTPATIENT
Start: 2019-10-29 | End: 2019-10-29 | Stop reason: HOSPADM

## 2019-10-29 RX ORDER — FENTANYL CITRATE 50 UG/ML
INJECTION, SOLUTION INTRAMUSCULAR; INTRAVENOUS AS NEEDED
Status: DISCONTINUED | OUTPATIENT
Start: 2019-10-29 | End: 2019-10-29 | Stop reason: SURG

## 2019-10-29 RX ORDER — OXYCODONE AND ACETAMINOPHEN 10; 325 MG/1; MG/1
1 TABLET ORAL ONCE AS NEEDED
Status: DISCONTINUED | OUTPATIENT
Start: 2019-10-29 | End: 2019-10-29 | Stop reason: HOSPADM

## 2019-10-29 RX ORDER — ONDANSETRON 2 MG/ML
4 INJECTION INTRAMUSCULAR; INTRAVENOUS ONCE AS NEEDED
Status: DISCONTINUED | OUTPATIENT
Start: 2019-10-29 | End: 2019-10-29 | Stop reason: HOSPADM

## 2019-10-29 RX ORDER — SODIUM CHLORIDE, SODIUM LACTATE, POTASSIUM CHLORIDE, CALCIUM CHLORIDE 600; 310; 30; 20 MG/100ML; MG/100ML; MG/100ML; MG/100ML
1000 INJECTION, SOLUTION INTRAVENOUS CONTINUOUS
Status: DISCONTINUED | OUTPATIENT
Start: 2019-10-29 | End: 2019-10-29 | Stop reason: HOSPADM

## 2019-10-29 RX ORDER — DEXAMETHASONE SODIUM PHOSPHATE 4 MG/ML
4 INJECTION, SOLUTION INTRA-ARTICULAR; INTRALESIONAL; INTRAMUSCULAR; INTRAVENOUS; SOFT TISSUE ONCE AS NEEDED
Status: COMPLETED | OUTPATIENT
Start: 2019-10-29 | End: 2019-10-29

## 2019-10-29 RX ORDER — PHENYLEPHRINE HCL IN 0.9% NACL 0.8MG/10ML
SYRINGE (ML) INTRAVENOUS AS NEEDED
Status: DISCONTINUED | OUTPATIENT
Start: 2019-10-29 | End: 2019-10-29 | Stop reason: SURG

## 2019-10-29 RX ORDER — BUPIVACAINE HCL/0.9 % NACL/PF 0.1 %
2 PLASTIC BAG, INJECTION (ML) EPIDURAL ONCE
Status: COMPLETED | OUTPATIENT
Start: 2019-10-29 | End: 2019-10-29

## 2019-10-29 RX ORDER — PROPOFOL 10 MG/ML
VIAL (ML) INTRAVENOUS AS NEEDED
Status: DISCONTINUED | OUTPATIENT
Start: 2019-10-29 | End: 2019-10-29 | Stop reason: SURG

## 2019-10-29 RX ORDER — IBUPROFEN 600 MG/1
600 TABLET ORAL ONCE AS NEEDED
Status: DISCONTINUED | OUTPATIENT
Start: 2019-10-29 | End: 2019-10-29 | Stop reason: HOSPADM

## 2019-10-29 RX ORDER — MAGNESIUM HYDROXIDE 1200 MG/15ML
LIQUID ORAL AS NEEDED
Status: DISCONTINUED | OUTPATIENT
Start: 2019-10-29 | End: 2019-10-29 | Stop reason: HOSPADM

## 2019-10-29 RX ADMIN — Medication 2 G: at 08:35

## 2019-10-29 RX ADMIN — Medication 80 MCG: at 08:47

## 2019-10-29 RX ADMIN — ACETAMINOPHEN 1000 MG: 500 TABLET, FILM COATED ORAL at 07:01

## 2019-10-29 RX ADMIN — Medication 80 MCG: at 08:51

## 2019-10-29 RX ADMIN — DEXAMETHASONE SODIUM PHOSPHATE 4 MG: 4 INJECTION, SOLUTION INTRAMUSCULAR; INTRAVENOUS at 07:01

## 2019-10-29 RX ADMIN — ONDANSETRON HYDROCHLORIDE 4 MG: 2 SOLUTION INTRAMUSCULAR; INTRAVENOUS at 09:06

## 2019-10-29 RX ADMIN — FENTANYL CITRATE 25 MCG: 50 INJECTION, SOLUTION INTRAMUSCULAR; INTRAVENOUS at 08:34

## 2019-10-29 RX ADMIN — SODIUM CHLORIDE, POTASSIUM CHLORIDE, SODIUM LACTATE AND CALCIUM CHLORIDE: 600; 310; 30; 20 INJECTION, SOLUTION INTRAVENOUS at 09:06

## 2019-10-29 RX ADMIN — FENTANYL CITRATE 25 MCG: 50 INJECTION, SOLUTION INTRAMUSCULAR; INTRAVENOUS at 08:30

## 2019-10-29 RX ADMIN — PROPOFOL 200 MG: 10 INJECTION, EMULSION INTRAVENOUS at 08:30

## 2019-10-29 RX ADMIN — LIDOCAINE HYDROCHLORIDE 80 MG: 20 INJECTION, SOLUTION INTRAVENOUS at 08:30

## 2019-10-29 RX ADMIN — EPHEDRINE SULFATE 10 MG: 50 INJECTION INTRAVENOUS at 09:06

## 2019-10-29 RX ADMIN — SODIUM CHLORIDE, POTASSIUM CHLORIDE, SODIUM LACTATE AND CALCIUM CHLORIDE 1000 ML: 600; 310; 30; 20 INJECTION, SOLUTION INTRAVENOUS at 06:24

## 2019-10-29 RX ADMIN — EPHEDRINE SULFATE 10 MG: 50 INJECTION INTRAVENOUS at 08:56

## 2019-10-29 RX ADMIN — DEXAMETHASONE SODIUM PHOSPHATE 4 MG: 4 INJECTION, SOLUTION INTRAMUSCULAR; INTRAVENOUS at 09:06

## 2019-10-29 RX ADMIN — MIDAZOLAM 2 MG: 1 INJECTION INTRAMUSCULAR; INTRAVENOUS at 08:11

## 2019-10-29 NOTE — ANESTHESIA POSTPROCEDURE EVALUATION
Patient: Israel Campos Jr.    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])    Surgeon:  Giancarlo Lacy MD Provider:  Virgen Eller CRNA    Anesthesia Type:  general ASA Status:  3          Anesthesia Type: general  Last vitals  BP   149/86 (10/29/19 1030)   Temp   98 °F (36.7 °C) (10/29/19 1000)   Pulse   87 (10/29/19 1030)   Resp   18 (10/29/19 1030)     SpO2   95 % (10/29/19 1030)     Post Anesthesia Care and Evaluation    Patient location during evaluation: PACU  Patient participation: complete - patient participated  Level of consciousness: awake and alert  Pain management: adequate  Airway patency: patent  Anesthetic complications: No anesthetic complications  PONV Status: controlled  Cardiovascular status: acceptable and hemodynamically stable  Respiratory status: acceptable  Hydration status: acceptable    Comments: Patient discharged from PACU prior to anesthesia evaluation based on Alexia Score.  For details, see RN note.     /86   Pulse 87   Temp 98 °F (36.7 °C) (Temporal)   Resp 18   SpO2 95%

## 2019-10-29 NOTE — ANESTHESIA PREPROCEDURE EVALUATION
Anesthesia Evaluation     Patient summary reviewed   history of anesthetic complications (20 yrs ago): difficult airway  NPO Solid Status: > 8 hours  NPO Liquid Status: > 2 hours           Airway   Mallampati: III  Possible difficult intubation  Dental          Pulmonary - normal exam    breath sounds clear to auscultation  (-) asthma, recent URI, sleep apnea, not a smoker  Cardiovascular - normal exam  Exercise tolerance: good (4-7 METS)    ECG reviewed  Patient on routine beta blocker and Beta blocker given within 24 hours of surgery  Rhythm: regular  Rate: normal    (+) hypertension, dysrhythmias (controlled with atenolol) Atrial Fib,   (-) pacemaker, past MI, angina, cardiac stents, CABG      Neuro/Psych  (-) seizures, TIA, CVA  GI/Hepatic/Renal/Endo    (+) obesity,  GERD,    (-) liver disease, no renal disease, diabetes, hypothyroidism, hyperthyroidism    Musculoskeletal     Abdominal    Substance History      OB/GYN          Other                        Anesthesia Plan    ASA 3     general     intravenous induction   Anesthetic plan, all risks, benefits, and alternatives have been provided, discussed and informed consent has been obtained with: patient.

## 2019-10-29 NOTE — ANESTHESIA PROCEDURE NOTES
Airway  Urgency: elective    Date/Time: 10/29/2019 8:32 AM  Airway not difficult    General Information and Staff    Patient location during procedure: OR  CRNA: Virgen Eller CRNA    Indications and Patient Condition  Indications for airway management: airway protection    Preoxygenated: yes  Mask difficulty assessment: 2 - vent by mask + OA or adjuvant +/- NMBA    Final Airway Details  Final airway type: supraglottic airway      Successful airway: flexible  Size 5    Number of attempts at approach: 1  Assessment: lips, teeth, and gum same as pre-op

## 2019-11-02 LAB
CYTO UR: NORMAL
LAB AP CASE REPORT: NORMAL
PATH REPORT.FINAL DX SPEC: NORMAL
PATH REPORT.GROSS SPEC: NORMAL

## 2019-11-06 ENCOUNTER — OFFICE VISIT (OUTPATIENT)
Dept: OTOLARYNGOLOGY | Facility: CLINIC | Age: 64
End: 2019-11-06

## 2019-11-06 VITALS
WEIGHT: 205 LBS | SYSTOLIC BLOOD PRESSURE: 145 MMHG | HEART RATE: 80 BPM | RESPIRATION RATE: 20 BRPM | BODY MASS INDEX: 30.36 KG/M2 | DIASTOLIC BLOOD PRESSURE: 85 MMHG | HEIGHT: 69 IN | TEMPERATURE: 98 F

## 2019-11-06 DIAGNOSIS — D03.39 MELANOMA IN SITU OF CHEEK (HCC): Primary | ICD-10-CM

## 2019-11-06 PROCEDURE — 99024 POSTOP FOLLOW-UP VISIT: CPT | Performed by: OTOLARYNGOLOGY

## 2019-11-06 NOTE — PROGRESS NOTES
"CC/Reason for visit: Israel Campos Jr. returns to the office following excision of a concerning lesion of the left cheek with linear closure on October 29, 2019.    SUBJECTIVE:  He is doing well and is without complaint.  Denies any fevers, chills, drainage.    OBJECTIVE:  /85   Pulse 80   Temp 98 °F (36.7 °C)   Resp 20   Ht 175.3 cm (69\")   Wt 93 kg (205 lb)   BMI 30.27 kg/m²   Incision is healing well.  Sutures were removed.  At the most superior aspect, there is some increased fullness consistent with normal fibrosis and edema.    Pathology:        ASSESSMENT:  Israel was seen today for post-op.    Diagnoses and all orders for this visit:    Melanoma in situ of cheek (CMS/HCC)        PLAN:   Pathology discussed.  Upgraded to melanoma in situ ([id number]).  Discussed that we would prefer 5 mm margins (although this recommendation is clinical, not pathological).  According to the pathology, the melanoma in situ does not extend to 2 mm; the atypical melanocytic proliferation extends to 2 mm.  Discussed observation versus taking additional at 3:00.  We will take an observational course right now.  F/U 6 weeks.    Protect the incisions from sunlight. Sunlight to the incisions will cause permanent pigmentation to the incision line and make the incision more noticeable. After the incision has reepithelialized (typically 2-3 weeks after the procedure), you may begin to use sunscreen with an SPF of 15 or greater    I discussed the use of a silicone-based wound cream to the incisions to optimize the end result. Apply topically twice daily, or as directed, to help optimize wound healing and decrease redness.    We discussed the importance of routine skin checks with a dermatologist or your PCP every 6-12 months.     Giancarlo Lacy MD   11/06/2019  2:38 PM    "

## 2019-12-18 ENCOUNTER — OFFICE VISIT (OUTPATIENT)
Dept: OTOLARYNGOLOGY | Facility: CLINIC | Age: 64
End: 2019-12-18

## 2019-12-18 VITALS — BODY MASS INDEX: 30.21 KG/M2 | TEMPERATURE: 98 F | WEIGHT: 204 LBS | RESPIRATION RATE: 20 BRPM | HEIGHT: 69 IN

## 2019-12-18 DIAGNOSIS — D22.9 BENIGN NEVUS: ICD-10-CM

## 2019-12-18 DIAGNOSIS — D03.39 MELANOMA IN SITU OF CHEEK (HCC): Primary | ICD-10-CM

## 2019-12-18 PROCEDURE — 99213 OFFICE O/P EST LOW 20 MIN: CPT | Performed by: OTOLARYNGOLOGY

## 2019-12-18 NOTE — PROGRESS NOTES
Chief Complaint   Patient presents with   • Follow-up     1) Left Cheek lesion exc  2) Left nasal tip lesion       Skin Cancer Follow-up and Surveillance Visit    CC/Reason for visit: Israel Campos Jr. 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    Subjective: Since surgery, He is doing well without complaints.  Symptoms denied postoperatively include pain, fever, chills, bleeding and drainage. The patient states the pain has ceased since surgery.     Patient presents for follow-up general head and neck skin examination.  Patient has a history of malignant melanoma. He has not noted recurrence.  The patient has not noted new worrisome lesions.    He has complaints of a skin lesion on the right nasal tip has been that has been present for the last year and is enlarging. Nothing makes this better or worse. 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  This is moderate in intensity.  The outward appearance of this bothers him, and he would like it removed for cosmetic reasons.     Pathology review: Pathology is available. Pathology demonstrates a diagnosis of malignant melanoma in situ, lentigo maligna type with clear margins    Dermatologist:  Eva Ley MD      Past Medical History:   Diagnosis Date   • Atrial fibrillation (CMS/HCC)    • Cancer (CMS/HCC)     SKIN CA   • GERD (gastroesophageal reflux disease)    • Hard to intubate     NARROW   • Hypertension      Past Surgical History:   Procedure Laterality Date   • HEAD/NECK LESION/CYST EXCISION Left 10/29/2019    Procedure: Excision of skin lesion of the left cheek with linear closure;  Surgeon: Giancarlo Lacy MD;  Location: [facility];  Service: ENT   • HERNIA REPAIR     • OTHER SURGICAL HISTORY Right     SCAR TISSUE REMOVED FROM R AXILLA     History reviewed. No pertinent family history.  Social History     Tobacco Use    • Smoking status: Never Smoker   • Smokeless tobacco: Never Used   Substance Use Topics   • Alcohol use: Yes     Alcohol/week: 14.0 standard drinks     Types: 14 Shots of liquor per week     Comment: WIFE REPORTS DISCREETLY PT HAS 2 DRINKS PER NIGHT   • Drug use: No     Allergies:  Shellfish-derived products; Azithromycin; Iodine; and Sudafed  [pseudoephedrine hcl]    Current Outpatient Medications:   •  albuterol sulfate  (90 Base) MCG/ACT inhaler, Inhale 2 puffs Daily As Needed for Shortness of Air., Disp: , Rfl:   •  ALPRAZolam (XANAX) 0.25 MG tablet, TAKE 1 TABLET BY MOUTH TWICE DAILY AS NEEDED FOR SLEEP OR ANXIETY FOR UP TO 90 DAYS, Disp: , Rfl: 0  •  atenolol (TENORMIN) 50 MG tablet, TAKE 1 TABLET BY MOUTH ONCE DAILY, Disp: , Rfl:   •  escitalopram (LEXAPRO) 10 MG tablet, Take 10 mg by mouth Daily., Disp: , Rfl:   •  fexofenadine (ALLEGRA) 180 MG tablet, Take 180 mg by mouth Daily., Disp: , Rfl:   •  guaiFENesin (MUCINEX) 600 MG 12 hr tablet, Take 1,200 mg by mouth Daily., Disp: , Rfl:   •  losartan (COZAAR) 100 MG tablet, Take 100 mg by mouth Daily., Disp: , Rfl:   •  methylcellulose, Laxative, (CITRUCEL) 500 MG tablet tablet, Take 2 tablets by mouth Daily., Disp: , Rfl:   •  omeprazole (priLOSEC) 20 MG capsule, Take 20 mg by mouth Daily., Disp: , Rfl:   •  simvastatin (ZOCOR) 20 MG tablet, TAKE 1 TABLET BY MOUTH ONCE DAILY, Disp: , Rfl:     Review of Systems   Constitutional: Negative for activity change, appetite change, chills, fatigue, fever and unexpected weight change.   HENT: Negative for congestion, dental problem, facial swelling and nosebleeds.    Eyes: Negative for discharge and redness.   Musculoskeletal: Negative for neck pain.   Skin: Positive for wound. Negative for color change, pallor and rash.   Neurological: Positive for facial asymmetry.   Hematological: Negative for adenopathy. Bruises/bleeds easily.   Psychiatric/Behavioral: The patient is nervous/anxious.        I have reviewed  "and edited the CC/HPI/ROS/PFSH/Allergy/Medication information entered into the note by the patient/ancillary staff to accurately document the details of this visit.    Objective:  Temp 98 °F (36.7 °C)   Resp 20   Ht 175.3 cm (69\")   Wt 92.5 kg (204 lb)   BMI 30.13 kg/m²     Physical Exam   Constitutional: He is oriented to person, place, and time. He appears well-developed and well-nourished. He is cooperative. No distress.   HENT:   Head: Normocephalic and atraumatic.       Right Ear: External ear normal.   Left Ear: External ear normal.   Nose: Nose normal.       Eyes: Pupils are equal, round, and reactive to light. Conjunctivae and EOM are normal. Right eye exhibits no discharge. Left eye exhibits no discharge. No scleral icterus.   Neck: Normal range of motion. Neck supple. No JVD present. No tracheal deviation present. No thyromegaly present.   Pulmonary/Chest: Effort normal. No stridor.   Musculoskeletal: Normal range of motion. He exhibits no edema or deformity.   Lymphadenopathy:     He has no cervical adenopathy.   Neurological: He is alert and oriented to person, place, and time. He has normal strength. No cranial nerve deficit. Coordination normal.   Skin: Skin is warm and dry. No rash noted. He is not diaphoretic. No erythema. No pallor.   Psychiatric: He has a normal mood and affect. His speech is normal and behavior is normal. Judgment and thought content normal. Cognition and memory are normal.   Nursing note and vitals reviewed.    Data review:    I have reviewed the pathology from the left cheek excision and the nasal biopsy:          Assessment:  Israel was seen today for follow-up.    Diagnoses and all orders for this visit:    Melanoma in situ of cheek (CMS/HCC)    Benign nevus        Plan:    We have discussed possible excision of the benign nevus of the right nasal tip with bilobe flap in the office under local anesthesia and permanent section analysis. He is not ready to proceed with " excision yet, but he will call should he decide to schedule this. We will provide him with a quote, as this is a benign lesion he would like removed for cosmetic reasons.    Discussion of skin lesion. Discussed risks, benefits, alternatives, and possible complications of excision of the skin lesion with reconstruction utilizing local tissue rearrangement, full-thickness skin grafting, or local interpolated flaps. Risks include, but are not limited too: bleeding, infection, hematoma, recurrence, need for additional procedures, flap failure, cosmetic deformity. Patient understands risks and would like to proceed with surgery.     Protect the incisions from sunlight. Sunlight to the incisions will cause permanent pigmentation to the incision line and make the incision more noticeable. After the incision has reepithelialized (typically 2-3 weeks after the procedure), you may begin to use sunscreen with an SPF of 15 or greater    I discussed the use of a silicone-based wound cream to the incisions to optimize the end result. Apply topically twice daily, or as directed, to help optimize wound healing and decrease redness.    We discussed the importance of routine skin checks with a dermatologist or your PCP every 6-12 months. He sees Dr. JJ Ley every 3 months.     Return in about 6 months (around 6/18/2020), or if symptoms worsen or fail to improve, for Recheck.      Giancarlo Lacy MD  12/18/19  11:13 AM

## 2020-01-03 ENCOUNTER — TELEPHONE (OUTPATIENT)
Dept: INTERNAL MEDICINE | Age: 65
End: 2020-01-03

## 2020-01-06 ENCOUNTER — TELEPHONE (OUTPATIENT)
Dept: INTERNAL MEDICINE | Age: 65
End: 2020-01-06

## 2020-01-27 ENCOUNTER — TELEPHONE (OUTPATIENT)
Dept: INTERNAL MEDICINE | Age: 65
End: 2020-01-27

## 2020-01-27 DIAGNOSIS — R55 VASOVAGAL SYNCOPE: Chronic | ICD-10-CM

## 2020-01-27 DIAGNOSIS — F33.40 RECURRENT MAJOR DEPRESSIVE DISORDER, IN REMISSION (HCC): Chronic | ICD-10-CM

## 2020-01-27 RX ORDER — ALPRAZOLAM 0.25 MG/1
0.25 TABLET ORAL 2 TIMES DAILY PRN
Qty: 180 TABLET | Refills: 0 | OUTPATIENT
Start: 2020-01-27 | End: 2020-04-26

## 2020-02-03 ENCOUNTER — PATIENT MESSAGE (OUTPATIENT)
Dept: INTERNAL MEDICINE | Age: 65
End: 2020-02-03

## 2020-02-07 ENCOUNTER — TELEPHONE (OUTPATIENT)
Dept: INTERNAL MEDICINE | Age: 65
End: 2020-02-07

## 2020-02-07 RX ORDER — ALPRAZOLAM 0.25 MG/1
0.25 TABLET ORAL 2 TIMES DAILY PRN
Qty: 60 TABLET | Refills: 0 | Status: SHIPPED | OUTPATIENT
Start: 2020-02-07 | End: 2020-02-10 | Stop reason: ALTCHOICE

## 2020-02-10 NOTE — TELEPHONE ENCOUNTER
I'm not sure why This was sent to me, he is not my patient and it should have gone to the on call MD.  Please pay attention to the appointments

## 2020-02-13 ENCOUNTER — OFFICE VISIT (OUTPATIENT)
Dept: INTERNAL MEDICINE | Facility: CLINIC | Age: 65
End: 2020-02-13

## 2020-02-13 VITALS
RESPIRATION RATE: 16 BRPM | TEMPERATURE: 98.5 F | DIASTOLIC BLOOD PRESSURE: 82 MMHG | HEART RATE: 87 BPM | BODY MASS INDEX: 29.33 KG/M2 | HEIGHT: 69 IN | WEIGHT: 198 LBS | SYSTOLIC BLOOD PRESSURE: 142 MMHG | OXYGEN SATURATION: 98 %

## 2020-02-13 DIAGNOSIS — E29.1 HYPOGONADISM IN MALE: ICD-10-CM

## 2020-02-13 DIAGNOSIS — I48.0 PAF (PAROXYSMAL ATRIAL FIBRILLATION) (HCC): ICD-10-CM

## 2020-02-13 DIAGNOSIS — I10 ESSENTIAL HYPERTENSION: Primary | ICD-10-CM

## 2020-02-13 DIAGNOSIS — F41.9 ANXIETY: ICD-10-CM

## 2020-02-13 DIAGNOSIS — R73.03 PREDIABETES: ICD-10-CM

## 2020-02-13 DIAGNOSIS — F32.A DEPRESSION, UNSPECIFIED DEPRESSION TYPE: ICD-10-CM

## 2020-02-13 DIAGNOSIS — Z12.5 PROSTATE CANCER SCREENING: ICD-10-CM

## 2020-02-13 DIAGNOSIS — E78.2 MIXED HYPERLIPIDEMIA: ICD-10-CM

## 2020-02-13 DIAGNOSIS — Z11.59 ENCOUNTER FOR HEPATITIS C SCREENING TEST FOR LOW RISK PATIENT: ICD-10-CM

## 2020-02-13 LAB — HBA1C MFR BLD: 5.6 %

## 2020-02-13 PROCEDURE — 83036 HEMOGLOBIN GLYCOSYLATED A1C: CPT | Performed by: INTERNAL MEDICINE

## 2020-02-13 PROCEDURE — 99204 OFFICE O/P NEW MOD 45 MIN: CPT | Performed by: INTERNAL MEDICINE

## 2020-02-13 RX ORDER — LOSARTAN POTASSIUM 100 MG/1
100 TABLET ORAL DAILY
Qty: 90 TABLET | Refills: 2 | Status: SHIPPED | OUTPATIENT
Start: 2020-02-13 | End: 2020-11-11

## 2020-02-13 RX ORDER — ATENOLOL 50 MG/1
50 TABLET ORAL DAILY
Qty: 90 TABLET | Refills: 2 | Status: SHIPPED | OUTPATIENT
Start: 2020-02-13 | End: 2020-12-29 | Stop reason: SDUPTHER

## 2020-02-13 RX ORDER — BETAMETHASONE DIPROPIONATE 0.5 MG/G
0.05 CREAM TOPICAL AS NEEDED
COMMUNITY
Start: 2020-01-07 | End: 2020-08-26

## 2020-02-13 RX ORDER — ALPRAZOLAM 0.25 MG/1
0.25 TABLET ORAL DAILY PRN
Qty: 180 TABLET | Refills: 0 | Status: SHIPPED | OUTPATIENT
Start: 2020-02-13 | End: 2020-04-27 | Stop reason: SDUPTHER

## 2020-02-13 RX ORDER — SIMVASTATIN 20 MG
20 TABLET ORAL DAILY
Qty: 90 TABLET | Refills: 2 | Status: SHIPPED | OUTPATIENT
Start: 2020-02-13 | End: 2020-08-26 | Stop reason: SDUPTHER

## 2020-02-13 RX ORDER — ESCITALOPRAM OXALATE 10 MG/1
10 TABLET ORAL DAILY
Qty: 90 TABLET | Refills: 2 | Status: SHIPPED | OUTPATIENT
Start: 2020-02-13 | End: 2020-07-16 | Stop reason: SDUPTHER

## 2020-02-13 RX ORDER — OMEPRAZOLE 20 MG/1
20 CAPSULE, DELAYED RELEASE ORAL DAILY
Qty: 90 CAPSULE | Refills: 2 | Status: SHIPPED | OUTPATIENT
Start: 2020-02-13

## 2020-02-13 NOTE — PROGRESS NOTES
Chief Complaint   Patient presents with   • Establish Care   • Med Refill         History:  Israel Campos Jr. is a 64 y.o. male who presents today for evaluation of the above problems.    Desean is a pleasant gentleman with previous medical history for hypertension, hyperlipidemia, paroxysmal atrial fibrillation, prediabetes, depression and anxiety who is here to establish care.  He is a previous patient of Dr. Campbell.    He last had labs March 2019 at [place].  His cholesterol is elevated at a total level of 206, HDL 42 and .  BMP was unremarkable except for hyperglycemia.  His A1c in 2018 was 5.7.  His liver function testing was normal.  His last PSA level was also July 12, 2018 which was 1.96.    He has seen Dr. Hill for excision of lesion on the left cheek on October 29, 2019.  Pathology showed malignant melanoma in situ, lentigo maligna type with clear margins.    CHADSVASC is 1 currently. When he turns 65 it will be 2.  We discussed anticoagulation but will discuss further at his next visit.      Yesterday, started taking a supplement for left index finger arthritis. Allergic to shellfish. Took 2 doses and started with high bp yesterday, terrible anxiety. Feels better today after taking Xanax.  He denied any rash.  He went 3 to 4 days without taking any Xanax prior to that episode.  He took 1 pill last night and then 2 this morning.    Due for colonoscopy per pt. Dr. Briones    HPI    ROS:  Review of Systems   Constitutional: Negative for activity change, appetite change, fatigue, fever and unexpected weight change.   HENT: Negative for nosebleeds, sore throat and trouble swallowing.    Eyes: Negative for pain and visual disturbance.   Respiratory: Negative for cough, chest tightness and shortness of breath.    Cardiovascular: Negative for chest pain, palpitations and leg swelling.        A. fib   Gastrointestinal: Negative for abdominal pain, constipation, diarrhea, nausea and vomiting.   Endocrine:  Negative for cold intolerance and heat intolerance.   Genitourinary: Positive for frequency. Negative for hematuria.   Musculoskeletal: Positive for arthralgias (Left index finger). Negative for back pain, neck pain and neck stiffness.   Skin: Negative for rash and wound.   Allergic/Immunologic: Positive for environmental allergies.   Neurological: Positive for tremors (worse with holding objects). Negative for dizziness, syncope, weakness, light-headedness and headaches.   Hematological: Negative for adenopathy. Does not bruise/bleed easily.   Psychiatric/Behavioral: Negative for agitation, behavioral problems and confusion. The patient is nervous/anxious.        Allergies   Allergen Reactions   • Shellfish-Derived Products Hives     iodine   • Azithromycin Nausea Only   • Iodine Rash   • Sudafed  [Pseudoephedrine Hcl] Rash     Past Medical History:   Diagnosis Date   • Atrial fibrillation (CMS/HCC)    • Cancer (CMS/HCC)     SKIN CA   • GERD (gastroesophageal reflux disease)    • Hard to intubate     NARROW   • Hypertension    • Melanoma in situ of cheek (CMS/HCC)      Past Surgical History:   Procedure Laterality Date   • HEAD/NECK LESION/CYST EXCISION Left 10/29/2019    Procedure: Excision of skin lesion of the left cheek with linear closure;  Surgeon: Giancarlo Lacy MD;  Location: [facility];  Service: ENT   • HERNIA REPAIR     • OTHER SURGICAL HISTORY Right     SCAR TISSUE REMOVED FROM R AXILLA     Family History   Problem Relation Age of Onset   • Cancer Mother    • Stroke Father    • Diabetes Maternal Grandfather      Israel  reports that he has never smoked. He has never used smokeless tobacco. He reports that he drinks about 14.0 standard drinks of alcohol per week. He reports that he does not use drugs.    I have reviewed and updated the above documentation (if necessary) including but not limited to chief complaint, ROS, PFSH, allergies and medications        Current Outpatient Medications:   •  albuterol  sulfate  (90 Base) MCG/ACT inhaler, Inhale 2 puffs Daily As Needed for Shortness of Air., Disp: , Rfl:   •  ALPRAZolam (XANAX) 0.25 MG tablet, Take 1 tablet by mouth Daily As Needed for Anxiety., Disp: 180 tablet, Rfl: 0  •  atenolol (TENORMIN) 50 MG tablet, Take 1 tablet by mouth Daily., Disp: 90 tablet, Rfl: 2  •  betamethasone, augmented, (DIPROLENE) 0.05 % cream, Apply 0.05 application topically to the appropriate area as directed As Needed. Patient stated he uses it as needed, Disp: , Rfl:   •  escitalopram (LEXAPRO) 10 MG tablet, Take 1 tablet by mouth Daily., Disp: 90 tablet, Rfl: 2  •  fexofenadine (ALLEGRA) 180 MG tablet, Take 180 mg by mouth Daily., Disp: , Rfl:   •  guaiFENesin (MUCINEX) 600 MG 12 hr tablet, Take 1,200 mg by mouth Daily., Disp: , Rfl:   •  losartan (COZAAR) 100 MG tablet, Take 1 tablet by mouth Daily., Disp: 90 tablet, Rfl: 2  •  methylcellulose, Laxative, (CITRUCEL) 500 MG tablet tablet, Take 2 tablets by mouth Daily., Disp: , Rfl:   •  omeprazole (priLOSEC) 20 MG capsule, Take 1 capsule by mouth Daily., Disp: 90 capsule, Rfl: 2  •  simvastatin (ZOCOR) 20 MG tablet, Take 1 tablet by mouth Daily., Disp: 90 tablet, Rfl: 2  •  aspirin 81 MG tablet, Take 1 tablet by mouth Daily., Disp: , Rfl:     PHQ-9 Depression Screening  Little interest or pleasure in doing things? 0   Feeling down, depressed, or hopeless? 0   Trouble falling or staying asleep, or sleeping too much?     Feeling tired or having little energy?     Poor appetite or overeating?     Feeling bad about yourself - or that you are a failure or have let yourself or your family down?     Trouble concentrating on things, such as reading the newspaper or watching television?     Moving or speaking so slowly that other people could have noticed? Or the opposite - being so fidgety or restless that you have been moving around a lot more than usual?     Thoughts that you would be better off dead, or of hurting yourself in some way?  "    PHQ-9 Total Score 0   If you checked off any problems, how difficult have these problems made it for you to do your work, take care of things at home, or get along with other people?       PHQ-9 Total Score: 0    OBJECTIVE:  Visit Vitals  /82 (BP Location: Left arm, Patient Position: Sitting, Cuff Size: Adult)   Pulse 87   Temp 98.5 °F (36.9 °C) (Oral)   Resp 16   Ht 175.3 cm (69.02\")   Wt 89.8 kg (198 lb)   SpO2 98%   BMI 29.23 kg/m²      Physical Exam   Constitutional: He is oriented to person, place, and time. He appears well-developed and well-nourished. No distress.   HENT:   Head: Normocephalic and atraumatic.   Mouth/Throat: Oropharynx is clear and moist. No oropharyngeal exudate.   Clear TMs bilaterally   Eyes: Pupils are equal, round, and reactive to light. Conjunctivae and EOM are normal. No scleral icterus.   Neck: Normal range of motion. Neck supple. No JVD present. No thyromegaly present.   Cardiovascular: Normal rate, regular rhythm and normal heart sounds. Exam reveals no gallop and no friction rub.   No murmur heard.  No lower extremity edema   Pulmonary/Chest: Effort normal and breath sounds normal. No stridor. He has no wheezes. He has no rales.   Abdominal: Soft. Bowel sounds are normal. He exhibits no distension. There is no tenderness. There is no rebound and no guarding.   Musculoskeletal: He exhibits no edema or tenderness.   Lymphadenopathy:     He has no cervical adenopathy.   Neurological: He is alert and oriented to person, place, and time. No cranial nerve deficit.   1+ bilateral patellar reflexes   Skin: Skin is warm and dry.   Psychiatric: He has a normal mood and affect. His behavior is normal.   Vitals reviewed.      MDM  Number of Diagnoses or Management Options  Anxiety: new, needed workup  Depression, unspecified depression type: new, no workup  Encounter for hepatitis C screening test for low risk patient: new, needed workup  Essential hypertension: new, needed " workup  Hypogonadism in male: new, needed workup  Mixed hyperlipidemia: new, needed workup  PAF (paroxysmal atrial fibrillation) (CMS/HCC): new, needed workup  Prediabetes: new, needed workup  Prostate cancer screening: new, needed workup     Amount and/or Complexity of Data Reviewed  Clinical lab tests: ordered and reviewed  Tests in the medicine section of CPT®: reviewed and ordered  Decide to obtain previous medical records or to obtain history from someone other than the patient: yes  Review and summarize past medical records: yes  Independent visualization of images, tracings, or specimens: yes    Risk of Complications, Morbidity, and/or Mortality  Presenting problems: moderate  Diagnostic procedures: moderate  Management options: moderate        Assessment/Plan    Israel was seen today for establish care and med refill.    Diagnoses and all orders for this visit:    Essential hypertension  -     CBC & Differential; Future  -     Comprehensive Metabolic Panel; Future  -     losartan (COZAAR) 100 MG tablet; Take 1 tablet by mouth Daily.  -     atenolol (TENORMIN) 50 MG tablet; Take 1 tablet by mouth Daily.    Prediabetes  -     POC Glycosylated Hemoglobin (Hb A1C)    Mixed hyperlipidemia  -     Lipid Panel; Future  -     simvastatin (ZOCOR) 20 MG tablet; Take 1 tablet by mouth Daily.    PAF (paroxysmal atrial fibrillation) (CMS/HCC)  -     aspirin 81 MG tablet; Take 1 tablet by mouth Daily.    Anxiety  -     ALPRAZolam (XANAX) 0.25 MG tablet; Take 1 tablet by mouth Daily As Needed for Anxiety.    Depression, unspecified depression type  -     escitalopram (LEXAPRO) 10 MG tablet; Take 1 tablet by mouth Daily.    Encounter for hepatitis C screening test for low risk patient  -     Hepatitis C Antibody; Future    Prostate cancer screening  -     PSA Screen; Future    Hypogonadism in male  -     Testosterone; Future    Other orders  -     omeprazole (priLOSEC) 20 MG capsule; Take 1 capsule by mouth  Daily.      Work-up as above.  We discussed prostate cancer screening and he would like this checked today.  Also discussed if his testosterone is low then he will need very close monitoring of his PSA level if we decide to start testosterone therapy.    CHADSVASC is 1 currently. When he turns 65 it will be 2.  We discussed anticoagulation but will discuss further at his next visit.    He declined influenza vaccine today    Education materials and an After Visit Summary were printed and given to the patient at discharge.  Return in about 6 months (around 8/13/2020) for Recheck.         FERMIN Garcia MD   8:03 AM  2/13/2020

## 2020-02-20 DIAGNOSIS — R73.03 PREDIABETES: Primary | ICD-10-CM

## 2020-04-27 DIAGNOSIS — F41.9 ANXIETY: ICD-10-CM

## 2020-04-27 RX ORDER — ALPRAZOLAM 0.25 MG/1
TABLET ORAL
Qty: 180 TABLET | Refills: 0 | Status: SHIPPED | OUTPATIENT
Start: 2020-04-27 | End: 2020-09-07 | Stop reason: SDUPTHER

## 2020-05-06 ENCOUNTER — APPOINTMENT (OUTPATIENT)
Dept: NUTRITION | Facility: HOSPITAL | Age: 65
End: 2020-05-06

## 2020-07-16 DIAGNOSIS — F32.A DEPRESSION, UNSPECIFIED DEPRESSION TYPE: ICD-10-CM

## 2020-07-16 RX ORDER — ESCITALOPRAM OXALATE 10 MG/1
10 TABLET ORAL DAILY
Qty: 90 TABLET | Refills: 2 | Status: SHIPPED | OUTPATIENT
Start: 2020-07-16 | End: 2021-04-13 | Stop reason: SDUPTHER

## 2020-07-28 ENCOUNTER — TELEPHONE (OUTPATIENT)
Dept: OTOLARYNGOLOGY | Facility: CLINIC | Age: 65
End: 2020-07-28

## 2020-08-20 ENCOUNTER — LAB (OUTPATIENT)
Dept: LAB | Facility: HOSPITAL | Age: 65
End: 2020-08-20

## 2020-08-20 DIAGNOSIS — Z12.5 PROSTATE CANCER SCREENING: ICD-10-CM

## 2020-08-20 DIAGNOSIS — I10 ESSENTIAL HYPERTENSION: ICD-10-CM

## 2020-08-20 DIAGNOSIS — Z11.59 ENCOUNTER FOR HEPATITIS C SCREENING TEST FOR LOW RISK PATIENT: ICD-10-CM

## 2020-08-20 DIAGNOSIS — E29.1 HYPOGONADISM IN MALE: ICD-10-CM

## 2020-08-20 DIAGNOSIS — E78.2 MIXED HYPERLIPIDEMIA: ICD-10-CM

## 2020-08-20 LAB
ALBUMIN SERPL-MCNC: 4.7 G/DL (ref 3.5–5.2)
ALBUMIN/GLOB SERPL: 2.2 G/DL
ALP SERPL-CCNC: 82 U/L (ref 39–117)
ALT SERPL W P-5'-P-CCNC: 24 U/L (ref 1–41)
ANION GAP SERPL CALCULATED.3IONS-SCNC: 13 MMOL/L (ref 5–15)
AST SERPL-CCNC: 20 U/L (ref 1–40)
BASOPHILS # BLD AUTO: 0.12 10*3/MM3 (ref 0–0.2)
BASOPHILS NFR BLD AUTO: 1.4 % (ref 0–1.5)
BILIRUB SERPL-MCNC: 0.5 MG/DL (ref 0–1.2)
BUN SERPL-MCNC: 15 MG/DL (ref 8–23)
BUN/CREAT SERPL: 15.5 (ref 7–25)
CALCIUM SPEC-SCNC: 9.7 MG/DL (ref 8.6–10.5)
CHLORIDE SERPL-SCNC: 103 MMOL/L (ref 98–107)
CHOLEST SERPL-MCNC: 220 MG/DL (ref 0–200)
CO2 SERPL-SCNC: 24 MMOL/L (ref 22–29)
CREAT SERPL-MCNC: 0.97 MG/DL (ref 0.76–1.27)
DEPRECATED RDW RBC AUTO: 39.1 FL (ref 37–54)
EOSINOPHIL # BLD AUTO: 0.84 10*3/MM3 (ref 0–0.4)
EOSINOPHIL NFR BLD AUTO: 10 % (ref 0.3–6.2)
ERYTHROCYTE [DISTWIDTH] IN BLOOD BY AUTOMATED COUNT: 12.6 % (ref 12.3–15.4)
GFR SERPL CREATININE-BSD FRML MDRD: 78 ML/MIN/1.73
GLOBULIN UR ELPH-MCNC: 2.1 GM/DL
GLUCOSE SERPL-MCNC: 117 MG/DL (ref 65–99)
HCT VFR BLD AUTO: 40.8 % (ref 37.5–51)
HCV AB SER DONR QL: NORMAL
HDLC SERPL-MCNC: 49 MG/DL (ref 40–60)
HGB BLD-MCNC: 13.4 G/DL (ref 13–17.7)
IMM GRANULOCYTES # BLD AUTO: 0.04 10*3/MM3 (ref 0–0.05)
IMM GRANULOCYTES NFR BLD AUTO: 0.5 % (ref 0–0.5)
LDLC SERPL CALC-MCNC: 142 MG/DL (ref 0–100)
LDLC/HDLC SERPL: 2.9 {RATIO}
LYMPHOCYTES # BLD AUTO: 1.95 10*3/MM3 (ref 0.7–3.1)
LYMPHOCYTES NFR BLD AUTO: 23.3 % (ref 19.6–45.3)
MCH RBC QN AUTO: 28.6 PG (ref 26.6–33)
MCHC RBC AUTO-ENTMCNC: 32.8 G/DL (ref 31.5–35.7)
MCV RBC AUTO: 87 FL (ref 79–97)
MONOCYTES # BLD AUTO: 0.99 10*3/MM3 (ref 0.1–0.9)
MONOCYTES NFR BLD AUTO: 11.8 % (ref 5–12)
NEUTROPHILS NFR BLD AUTO: 4.44 10*3/MM3 (ref 1.7–7)
NEUTROPHILS NFR BLD AUTO: 53 % (ref 42.7–76)
NRBC BLD AUTO-RTO: 0 /100 WBC (ref 0–0.2)
PLATELET # BLD AUTO: 212 10*3/MM3 (ref 140–450)
PMV BLD AUTO: 11.6 FL (ref 6–12)
POTASSIUM SERPL-SCNC: 4.4 MMOL/L (ref 3.5–5.2)
PROT SERPL-MCNC: 6.8 G/DL (ref 6–8.5)
PSA SERPL-MCNC: 1.94 NG/ML (ref 0–4)
RBC # BLD AUTO: 4.69 10*6/MM3 (ref 4.14–5.8)
SODIUM SERPL-SCNC: 140 MMOL/L (ref 136–145)
TESTOST SERPL-MCNC: 387 NG/DL (ref 193–740)
TRIGL SERPL-MCNC: 144 MG/DL (ref 0–150)
VLDLC SERPL-MCNC: 28.8 MG/DL (ref 5–40)
WBC # BLD AUTO: 8.38 10*3/MM3 (ref 3.4–10.8)

## 2020-08-20 PROCEDURE — 85025 COMPLETE CBC W/AUTO DIFF WBC: CPT | Performed by: INTERNAL MEDICINE

## 2020-08-20 PROCEDURE — 80061 LIPID PANEL: CPT | Performed by: INTERNAL MEDICINE

## 2020-08-20 PROCEDURE — 36415 COLL VENOUS BLD VENIPUNCTURE: CPT

## 2020-08-20 PROCEDURE — 84403 ASSAY OF TOTAL TESTOSTERONE: CPT | Performed by: INTERNAL MEDICINE

## 2020-08-20 PROCEDURE — G0103 PSA SCREENING: HCPCS | Performed by: INTERNAL MEDICINE

## 2020-08-20 PROCEDURE — 86803 HEPATITIS C AB TEST: CPT | Performed by: INTERNAL MEDICINE

## 2020-08-20 PROCEDURE — 80053 COMPREHEN METABOLIC PANEL: CPT | Performed by: INTERNAL MEDICINE

## 2020-08-26 ENCOUNTER — OFFICE VISIT (OUTPATIENT)
Dept: INTERNAL MEDICINE | Facility: CLINIC | Age: 65
End: 2020-08-26

## 2020-08-26 VITALS
SYSTOLIC BLOOD PRESSURE: 150 MMHG | DIASTOLIC BLOOD PRESSURE: 102 MMHG | HEART RATE: 77 BPM | HEIGHT: 69 IN | OXYGEN SATURATION: 98 % | BODY MASS INDEX: 29.55 KG/M2 | TEMPERATURE: 98.3 F | WEIGHT: 199.5 LBS

## 2020-08-26 DIAGNOSIS — I10 ESSENTIAL HYPERTENSION: ICD-10-CM

## 2020-08-26 DIAGNOSIS — R73.03 PREDIABETES: ICD-10-CM

## 2020-08-26 DIAGNOSIS — Z00.00 ENCOUNTER FOR PREVENTIVE CARE: Primary | ICD-10-CM

## 2020-08-26 DIAGNOSIS — E66.3 OVERWEIGHT: ICD-10-CM

## 2020-08-26 DIAGNOSIS — E78.2 MIXED HYPERLIPIDEMIA: ICD-10-CM

## 2020-08-26 DIAGNOSIS — F41.9 ANXIETY: ICD-10-CM

## 2020-08-26 PROCEDURE — 99397 PER PM REEVAL EST PAT 65+ YR: CPT | Performed by: INTERNAL MEDICINE

## 2020-08-26 RX ORDER — AMLODIPINE BESYLATE 5 MG/1
5 TABLET ORAL DAILY
Qty: 90 TABLET | Refills: 1 | Status: SHIPPED | OUTPATIENT
Start: 2020-08-26 | End: 2021-06-10

## 2020-08-26 RX ORDER — HYDROCHLOROTHIAZIDE 12.5 MG/1
12.5 TABLET ORAL DAILY
Qty: 90 TABLET | Refills: 1 | Status: SHIPPED | OUTPATIENT
Start: 2020-08-26 | End: 2020-08-26

## 2020-08-26 RX ORDER — SIMVASTATIN 40 MG
40 TABLET ORAL DAILY
Qty: 90 TABLET | Refills: 1 | Status: SHIPPED | OUTPATIENT
Start: 2020-08-26 | End: 2021-01-04 | Stop reason: SDUPTHER

## 2020-08-26 NOTE — PROGRESS NOTES
Chief Complaint   Patient presents with   • Hypertension         History:  Israel Campos Jr. is a 65 y.o. male who presents today for evaluation of the above problems.    He presents today for his annual physical.    His recent labs preparation for this visit were reviewed with him prior to his office visit and again with him in the office today.    His cholesterol was elevated on Zocor 20 mg daily    His PSA level was 1.94    He is concerned about testosterone level because of his poor energy.  It was around 380 though.  We discussed the risk of treating low testosterone and increasing the risk of prostate cancer    He takes 1 Xanax per day or he may take an extra 1 of something stressful is going on within his life     I reviewed his blood pressure log through my chart.  At home his blood pressure has been elevated as well with losartan 100 mg daily    Reviewed his CBC and he had slightly elevated eosinophil percent.  Discussed that this is from allergies which he does have.    He also had some questions about COVID-19 and my ability to prescribe him Plaquenil if necessary.    He has had some complaints of decreased urinary stream or dribbling after urinating.    The 10-year ASCVD risk score (Merlytone HERRERA Jr., et al., 2013) is: 20.6%    Values used to calculate the score:      Age: 65 years      Sex: Male      Is Non- : No      Diabetic: No      Tobacco smoker: No      Systolic Blood Pressure: 150 mmHg      Is BP treated: Yes      HDL Cholesterol: 49 mg/dL      Total Cholesterol: 220 mg/dL    No new updates to his allergies, past medical history, social history or family history    HPI    ROS:  Review of Systems   Constitutional: Negative for activity change, appetite change, fatigue, fever and unexpected weight change.   HENT: Negative for nosebleeds, sore throat and trouble swallowing.    Eyes: Negative for pain and visual disturbance.   Respiratory: Negative for cough, chest tightness and  shortness of breath.    Cardiovascular: Negative for chest pain, palpitations and leg swelling.        A. fib   Gastrointestinal: Negative for abdominal pain, constipation, diarrhea, nausea and vomiting.   Endocrine: Negative for cold intolerance and heat intolerance.   Genitourinary: Negative for hematuria and urgency.        Dribbling   Musculoskeletal: Negative for back pain, neck pain and neck stiffness.   Skin: Negative for rash and wound.   Allergic/Immunologic: Positive for environmental allergies.   Neurological: Negative for dizziness, syncope, weakness, light-headedness and headaches.   Hematological: Negative for adenopathy. Does not bruise/bleed easily.   Psychiatric/Behavioral: Negative for agitation, behavioral problems and confusion. The patient is nervous/anxious.        Allergies   Allergen Reactions   • Shellfish-Derived Products Hives     iodine   • Azithromycin Nausea Only   • Iodine Rash   • Sudafed  [Pseudoephedrine Hcl] Rash         Current Outpatient Medications:   •  albuterol sulfate  (90 Base) MCG/ACT inhaler, Inhale 2 puffs Daily As Needed for Shortness of Air., Disp: , Rfl:   •  ALPRAZolam (XANAX) 0.25 MG tablet, Take 1-2 tablets daily as needed for anxiety, Disp: 180 tablet, Rfl: 0  •  aspirin 81 MG tablet, Take 1 tablet by mouth Daily., Disp: , Rfl:   •  atenolol (TENORMIN) 50 MG tablet, Take 1 tablet by mouth Daily., Disp: 90 tablet, Rfl: 2  •  escitalopram (LEXAPRO) 10 MG tablet, Take 1 tablet by mouth Daily., Disp: 90 tablet, Rfl: 2  •  fexofenadine (ALLEGRA) 180 MG tablet, Take 180 mg by mouth Daily., Disp: , Rfl:   •  guaiFENesin (MUCINEX) 600 MG 12 hr tablet, Take 1,200 mg by mouth Daily., Disp: , Rfl:   •  losartan (COZAAR) 100 MG tablet, Take 1 tablet by mouth Daily., Disp: 90 tablet, Rfl: 2  •  methylcellulose, Laxative, (CITRUCEL) 500 MG tablet tablet, Take 2 tablets by mouth Daily., Disp: , Rfl:   •  omeprazole (priLOSEC) 20 MG capsule, Take 1 capsule by mouth Daily.,  "Disp: 90 capsule, Rfl: 2  •  simvastatin (ZOCOR) 40 MG tablet, Take 1 tablet by mouth Daily., Disp: 90 tablet, Rfl: 1  •  amLODIPine (NORVASC) 5 MG tablet, Take 1 tablet by mouth Daily., Disp: 90 tablet, Rfl: 1        OBJECTIVE:  Visit Vitals  BP (!) 150/102 (BP Location: Right arm, Patient Position: Sitting, Cuff Size: Adult)   Pulse 77   Temp 98.3 °F (36.8 °C) (Oral)   Ht 175.3 cm (69\")   Wt 90.5 kg (199 lb 8 oz)   SpO2 98%   BMI 29.46 kg/m²      Physical Exam   Constitutional: He appears well-developed and well-nourished. No distress.   HENT:   Head: Normocephalic and atraumatic.   Right Ear: External ear normal.   Left Ear: External ear normal.   Eyes: Pupils are equal, round, and reactive to light. EOM are normal.   Neck: Phonation normal. No thyromegaly present.   Cardiovascular: Normal rate, regular rhythm and normal heart sounds.   No murmur heard.  Pulmonary/Chest: Effort normal and breath sounds normal. No stridor. No respiratory distress. He has no wheezes. He has no rales.   Abdominal: Soft. Bowel sounds are normal. He exhibits no distension and no mass. There is no tenderness. There is no guarding.   Neurological: He is alert.   Skin: Skin is warm and dry. He is not diaphoretic. No erythema. No pallor.   Psychiatric: He has a normal mood and affect. His behavior is normal. Judgment and thought content normal.   Vitals reviewed.      Assessment/Plan    Israel was seen today for hypertension.    Diagnoses and all orders for this visit:    Encounter for preventive care    Mixed hyperlipidemia  -     simvastatin (ZOCOR) 40 MG tablet; Take 1 tablet by mouth Daily.  -     Lipid Panel; Future    Essential hypertension  -     Discontinue: hydroCHLOROthiazide (HYDRODIURIL) 12.5 MG tablet; Take 1 tablet by mouth Daily.  -     amLODIPine (NORVASC) 5 MG tablet; Take 1 tablet by mouth Daily.    Prediabetes  -     Hemoglobin A1c; Future    Anxiety    Overweight      His blood pressure is uncontrolled.  Continue " losartan 100 mg daily and atenolol.  Start Norvasc 5 mg daily.  He was instructed to keep a blood pressure log.    He is attempting to lose weight we discussed exercise and specific diet plans.  If as he starts to lose weight his blood pressure decreases we can back off his Norvasc.  Discussed possible side effects including swelling or lightheadedness or dizziness    His cholesterol is not controlled and that he has a 10-year ASCVD risk of 20%.  Continue his baby aspirin and increase Zocor to 40 mg daily.  Recheck his lipid panel in 3 months    He does have prediabetes would like to check an A1c in 3 months.  We discussed continued diet and exercise today    Continue taking as needed Xanax.  Discussed that this could make him have a low energy    He will be due for his colonoscopy December 27, 2023    Follow-up 6 months for recheck or sooner if clinically indicated    Education materials and an After Visit Summary were printed and given to the patient at discharge.    Return in about 6 months (around 2/26/2021) for Welcome to medicare.      Patient's Body mass index is 29.46 kg/m². BMI is above normal parameters. Recommendations include: educational material, exercise counseling and nutrition counseling.      FERMIN Garcia MD   4:32 PM  8/26/2020

## 2020-08-26 NOTE — PATIENT INSTRUCTIONS
"Diet for Metabolic Syndrome  Metabolic syndrome is a disorder that includes three or more of the following conditions:  · Abdominal obesity, as seen in a large waist circumference.  · Too much sugar (glucose) in your blood.  · High blood pressure (hypertension).  · Higher-than-normal amount of fat (lipids) in your blood.  · Lower-than-normal level of \"good\" cholesterol (HDL).  Keeping an active lifestyle and following a healthy diet plan can help you manage your condition. These changes can also help to prevent the development of conditions that are associated with metabolic syndrome, such as diabetes, heart disease, and stroke.  Following a heart-healthy diet can help you to lower your risk of heart disease and improve metabolic syndrome. A heart-healthy diet includes lean proteins, healthy fats, nuts, and plenty of fruits and vegetables. Along with regular exercise, a healthy diet:  · Helps to improve the way the body uses insulin.  · Helps with weight loss. A common goal for people with this condition is to lose 7-10% or more of their starting weight.  ? For example, a person who starts at 300 lb (136 kg) and loses 30 lb (13.6 kg) has lost 10% of his or her starting weight.  What are tips for following this plan?  · Use the glycemic index (GI) to plan your meals. The index tells you how quickly a food will raise your blood sugar. Choose foods that have low GI values. Those foods raise blood sugar more slowly.  · Keep track of how many calories you eat and drink. Consuming the right amount of calories will help you achieve a healthy weight.  · Consider following a Mediterranean diet. This diet includes lots of vegetables, lean meats or fish, whole grains, fruits, and healthy oils and fats.  · Use more herbs and spices to add flavor to your food instead of using butter and salt (sodium).  · Try to eat fish 1-2 times a week.  · Choose a variety of fruits, vegetables, and other recommended foods.  What foods can I " eat?    Fruits  Berries. Apples. Oranges. Peaches. Apricots. Plums. Grapes. Artie. Papaya. Pomegranate. Kiwi. Cherries.  Vegetables  Lettuce. Spinach. Leafy greens, including kale, chard, gricelda greens, and mustard greens. Peas. Beets. Cauliflower. Cabbage. Broccoli. Carrots. Green beans. Tomatoes. Squash. Eggplant. Peppers. Onions. Cucumbers. [place] sprouts. Sweet potatoes. Yams. Beans. Lentils.  Grains  Stone-ground whole wheat. Pumpernickel bread. Whole-grain bread, crackers, tortillas, cereal, and pasta. Unsweetened oatmeal. Bulgur. Barley. Quinoa. Brown rice or wild rice.  Meats and other proteins  Seafood and shellfish. Lean meats. Poultry. Tofu. Nuts and seeds.  Dairy  Low-fat or fat-free dairy products, such as milk, yogurt, and cheese.  Fats and oils  Avocado. Canola or olive oil. Nuts and nut butters. Seeds. Tahini.  Beverages  Water. Low-fat milk. Milk alternatives, like soy milk or almond milk. Real fruit juice.  Seasonings and condiments  Low-sugar or sugar-free ketchup, barbecue sauce, and mayonnaise. Mustard. Relish. Herbs.  The items listed above may not be a complete list of foods and beverages you can eat. Contact a dietitian for more information.  What foods are not recommended?  Meats and other proteins  Red meat.  Fats and oils  Saturated fats, such as butter, shortening, and palm oil.  Beverages  Alcohol. Sweetened drinks, such as iced tea and soda.  Other foods  Fried foods. Sweets. Salty foods.  The items listed above may not be a complete list of foods and beverages you should avoid. Contact a dietitian for more information.  Summary  · Metabolic syndrome is a combination of conditions such as abdominal obesity, high blood sugar, high blood pressure, and unhealthy levels of cholesterol. Metabolic syndrome raises your risk of developing heart disease, diabetes, and stroke.  · Following a heart-healthy diet can help you lower your risk of heart disease and improve metabolic syndrome. This  diet includes lean proteins, healthy fats, nuts, and plenty of fruits and vegetables.  · Along with regular exercise, following a healthy diet can help your body use insulin better and help you lose weight.  · A common goal for people with metabolic syndrome is to lose 7-10% of their starting weight.  This information is not intended to replace advice given to you by your health care provider. Make sure you discuss any questions you have with your health care provider.  Document Released: 05/03/2016 Document Revised: 04/08/2020 Document Reviewed: 10/19/2018  Elsevier Patient Education © 2020 [employer]    Exercising to Lose Weight  Exercise is structured, repetitive physical activity to improve fitness and health. Getting regular exercise is important for everyone. It is especially important if you are overweight. Being overweight increases your risk of heart disease, stroke, diabetes, high blood pressure, and several types of cancer. Reducing your calorie intake and exercising can help you lose weight.  Exercise is usually categorized as moderate or vigorous intensity. To lose weight, most people need to do a certain amount of moderate-intensity or vigorous-intensity exercise each week.  Moderate-intensity exercise    Moderate-intensity exercise is any activity that gets you moving enough to burn at least three times more energy (calories) than if you were sitting.  Examples of moderate exercise include:  · Walking a mile in 15 minutes.  · Doing light yard work.  · Biking at an easy pace.  Most people should get at least 150 minutes (2 hours and 30 minutes) a week of moderate-intensity exercise to maintain their body weight.  Vigorous-intensity exercise  Vigorous-intensity exercise is any activity that gets you moving enough to burn at least six times more calories than if you were sitting. When you exercise at this intensity, you should be working hard enough that you are not able to carry on a  conversation.  Examples of vigorous exercise include:  · Running.  · Playing a team sport, such as football, basketball, and soccer.  · Jumping rope.  Most people should get at least 75 minutes (1 hour and 15 minutes) a week of vigorous-intensity exercise to maintain their body weight.  How can exercise affect me?  When you exercise enough to burn more calories than you eat, you lose weight. Exercise also reduces body fat and builds muscle. The more muscle you have, the more calories you burn. Exercise also:  · Improves mood.  · Reduces stress and tension.  · Improves your overall fitness, flexibility, and endurance.  · Increases bone strength.  The amount of exercise you need to lose weight depends on:  · Your age.  · The type of exercise.  · Any health conditions you have.  · Your overall physical ability.  Talk to your health care provider about how much exercise you need and what types of activities are safe for you.  What actions can I take to lose weight?  Nutrition    · Make changes to your diet as told by your health care provider or diet and nutrition specialist (dietitian). This may include:  ? Eating fewer calories.  ? Eating more protein.  ? Eating less unhealthy fats.  ? Eating a diet that includes fresh fruits and vegetables, whole grains, low-fat dairy products, and lean protein.  ? Avoiding foods with added fat, salt, and sugar.  · Drink plenty of water while you exercise to prevent dehydration or heat stroke.  Activity  · Choose an activity that you enjoy and set realistic goals. Your health care provider can help you make an exercise plan that works for you.  · Exercise at a moderate or vigorous intensity most days of the week.  ? The intensity of exercise may vary from person to person. You can tell how intense a workout is for you by paying attention to your breathing and heartbeat. Most people will notice their breathing and heartbeat get faster with more intense exercise.  · Do resistance  training twice each week, such as:  ? Push-ups.  ? Sit-ups.  ? Lifting weights.  ? Using resistance bands.  · Getting short amounts of exercise can be just as helpful as long structured periods of exercise. If you have trouble finding time to exercise, try to include exercise in your daily routine.  ? Get up, stretch, and walk around every 30 minutes throughout the day.  ? Go for a walk during your lunch break.  ? Park your car farther away from your destination.  ? If you take public transportation, get off one stop early and walk the rest of the way.  ? Make phone calls while standing up and walking around.  ? Take the stairs instead of elevators or escalators.  · Wear comfortable clothes and shoes with good support.  · Do not exercise so much that you hurt yourself, feel dizzy, or get very short of breath.  Where to find more information  · [place] Department of Health and Human Services: www.hhs.gov  · Centers for Disease Control and Prevention (CDC): www.cdc.gov  Contact a health care provider:  · Before starting a new exercise program.  · If you have questions or concerns about your weight.  · If you have a medical problem that keeps you from exercising.  Get help right away if you have any of the following while exercising:  · Injury.  · Dizziness.  · Difficulty breathing or shortness of breath that does not go away when you stop exercising.  · Chest pain.  · Rapid heartbeat.  Summary  · Being overweight increases your risk of heart disease, stroke, diabetes, high blood pressure, and several types of cancer.  · Losing weight happens when you burn more calories than you eat.  · Reducing the amount of calories you eat in addition to getting regular moderate or vigorous exercise each week helps you lose weight.  This information is not intended to replace advice given to you by your health care provider. Make sure you discuss any questions you have with your health care provider.  Document Released: 01/20/2012  Document Revised: 12/31/2018 Document Reviewed: 12/31/2018  [employer] Patient Education © 2020 [employer]    Prediabetes Eating Plan  Prediabetes is a condition that causes blood sugar (glucose) levels to be higher than normal. This increases the risk for developing diabetes. In order to prevent diabetes from developing, your health care provider may recommend a diet and other lifestyle changes to help you:  · Control your blood glucose levels.  · Improve your cholesterol levels.  · Manage your blood pressure.  Your health care provider may recommend working with a diet and nutrition specialist (dietitian) to make a meal plan that is best for you.  What are tips for following this plan?  Lifestyle  · Set weight loss goals with the help of your health care team. It is recommended that most people with prediabetes lose 7% of their current body weight.  · Exercise for at least 30 minutes at least 5 days a week.  · Attend a support group or seek ongoing support from a mental health counselor.  · Take over-the-counter and prescription medicines only as told by your health care provider.  Reading food labels  · Read food labels to check the amount of fat, salt (sodium), and sugar in prepackaged foods. Avoid foods that have:  ? Saturated fats.  ? Trans fats.  ? Added sugars.  · Avoid foods that have more than 300 milligrams (mg) of sodium per serving. Limit your daily sodium intake to less than 2,300 mg each day.  Shopping  · Avoid buying pre-made and processed foods.  Cooking  · Cook with olive oil. Do not use butter, lard, or ghee.  · Bake, broil, grill, or boil foods. Avoid frying.  Meal planning    · Work with your dietitian to develop an eating plan that is right for you. This may include:  ? Tracking how many calories you take in. Use a food diary, notebook, or mobile application to track what you eat at each meal.  ? Using the glycemic index (GI) to plan your meals. The index tells you how quickly a food will  raise your blood glucose. Choose low-GI foods. These foods take a longer time to raise blood glucose.  · Consider following a Mediterranean diet. This diet includes:  ? Several servings each day of fresh fruits and vegetables.  ? Eating fish at least twice a week.  ? Several servings each day of whole grains, beans, nuts, and seeds.  ? Using olive oil instead of other fats.  ? Moderate alcohol consumption.  ? Eating small amounts of red meat and whole-fat dairy.  · If you have high blood pressure, you may need to limit your sodium intake or follow a diet such as the DASH eating plan. DASH is an eating plan that aims to lower high blood pressure.  What foods are recommended?  The items listed below may not be a complete list. Talk with your dietitian about what dietary choices are best for you.  Grains  Whole grains, such as whole-wheat or whole-grain breads, crackers, cereals, and pasta. Unsweetened oatmeal. Bulgur. Barley. Quinoa. Brown rice. Corn or whole-wheat flour tortillas or taco shells.  Vegetables  Lettuce. Spinach. Peas. Beets. Cauliflower. Cabbage. Broccoli. Carrots. Tomatoes. Squash. Eggplant. Herbs. Peppers. Onions. Cucumbers. [place] sprouts.  Fruits  Berries. Bananas. Apples. Oranges. Grapes. Papaya. Artie. Pomegranate. Kiwi. Grapefruit. Cherries.  Meats and other protein foods  Seafood. Poultry without skin. Lean cuts of pork and beef. Tofu. Eggs. Nuts. Beans.  Dairy  Low-fat or fat-free dairy products, such as yogurt, cottage cheese, and cheese.  Beverages  Water. Tea. Coffee. Sugar-free or diet soda. [place] water. Lowfat or no-fat milk. Milk alternatives, such as soy or almond milk.  Fats and oils  Olive oil. Canola oil. Sunflower oil. Grapeseed oil. Avocado. Walnuts.  Sweets and desserts  Sugar-free or low-fat pudding. Sugar-free or low-fat ice cream and other frozen treats.  Seasoning and other foods  Herbs. Sodium-free spices. Mustard. Relish. Low-fat, low-sugar ketchup. Low-fat, low-sugar  barbecue sauce. Low-fat or fat-free mayonnaise.  What foods are not recommended?  The items listed below may not be a complete list. Talk with your dietitian about what dietary choices are best for you.  Grains  Refined white flour and flour products, such as bread, pasta, snack foods, and cereals.  Vegetables  Canned vegetables. Frozen vegetables with butter or cream sauce.  Fruits  Fruits canned with syrup.  Meats and other protein foods  Fatty cuts of meat. Poultry with skin. Breaded or fried meat. Processed meats.  Dairy  Full-fat yogurt, cheese, or milk.  Beverages  Sweetened drinks, such as sweet iced tea and soda.  Fats and oils  Butter. Lard. Ghee.  Sweets and desserts  Baked goods, such as cake, cupcakes, pastries, cookies, and cheesecake.  Seasoning and other foods  Spice mixes with added salt. Ketchup. Barbecue sauce. Mayonnaise.  Summary  · To prevent diabetes from developing, you may need to make diet and other lifestyle changes to help control blood sugar, improve cholesterol levels, and manage your blood pressure.  · Set weight loss goals with the help of your health care team. It is recommended that most people with prediabetes lose 7 percent of their current body weight.  · Consider following a Mediterranean diet that includes plenty of fresh fruits and vegetables, whole grains, beans, nuts, seeds, fish, lean meat, low-fat dairy, and healthy oils.  This information is not intended to replace advice given to you by your health care provider. Make sure you discuss any questions you have with your health care provider.  Document Released: 05/03/2016 Document Revised: 04/10/2020 Document Reviewed: 02/21/2018  [employer] Patient Education © 2020 [employer]    Prediabetes  Prediabetes is the condition of having a blood sugar (blood glucose) level that is higher than it should be, but not high enough for you to be diagnosed with type 2 diabetes. Having prediabetes puts you at risk for developing type 2  diabetes (type 2 diabetes mellitus). Prediabetes may be called impaired glucose tolerance or impaired fasting glucose.  Prediabetes usually does not cause symptoms. Your health care provider can diagnose this condition with blood tests. You may be tested for prediabetes if you are overweight and if you have at least one other risk factor for prediabetes.  What is blood glucose, and how is it measured?  Blood glucose refers to the amount of glucose in your bloodstream. Glucose comes from eating foods that contain sugars and starches (carbohydrates), which the body breaks down into glucose. Your blood glucose level may be measured in mg/dL (milligrams per deciliter) or mmol/L (millimoles per liter). Your blood glucose may be checked with one or more of the following blood tests:  · A fasting blood glucose (FBG) test. You will not be allowed to eat (you will fast) for 8 hours or longer before a blood sample is taken.  ? A normal range for FBG is  mg/dl (3.9-5.6 mmol/L).  · An A1c (hemoglobin A1c) blood test. This test provides information about blood glucose control over the previous 2?3 months.  · An oral glucose tolerance test (OGTT). This test measures your blood glucose at two times:  ? After fasting. This is your baseline level.  ? Two hours after you drink a beverage that contains glucose.  You may be diagnosed with prediabetes:  · If your FBG is 100?125 mg/dL (5.6-6.9 mmol/L).  · If your A1c level is 5.7?6.4%.  · If your OGTT result is 140?199 mg/dL (7.8-11 mmol/L).  These blood tests may be repeated to confirm your diagnosis.  How can this condition affect me?  The pancreas produces a hormone (insulin) that helps to move glucose from the bloodstream into cells. When cells in the body do not respond properly to insulin that the body makes (insulin resistance), excess glucose builds up in the blood instead of going into cells. As a result, high blood glucose (hyperglycemia) can develop, which can cause many  complications. Hyperglycemia is a symptom of prediabetes.  Having high blood glucose for a long time is dangerous. Too much glucose in your blood can damage your nerves and blood vessels. Long-term damage can lead to complications from diabetes, which may include:  · Heart disease.  · Stroke.  · Blindness.  · Kidney disease.  · Depression.  · Poor circulation in the feet and legs, which could lead to surgical removal (amputation) in severe cases.  What can increase my risk?  Risk factors for prediabetes include:  · Having a family member with type 2 diabetes.  · Being overweight or obese.  · Being older than age 45.  · Being of , -American, /, or / descent.  · Having an inactive (sedentary) lifestyle.  · Having a history of heart disease.  · History of gestational diabetes or polycystic ovary syndrome (PCOS), in women.  · Having low levels of good cholesterol (HDL-C) or high levels of blood fats (triglycerides).  · Having high blood pressure.  What actions can I take to prevent diabetes?         · Be physically active.  ? Do moderate-intensity physical activity for 30 or more minutes on 5 or more days of the week, or as much as told by your health care provider. This could be brisk walking, biking, or water aerobics.  ? Ask your health care provider what activities are safe for you. A mix of physical activities may be best, such as walking, swimming, cycling, and strength training.  · Lose weight as told by your health care provider.  ? Losing 5-7% of your body weight can reverse insulin resistance.  ? Your health care provider can determine how much weight loss is best for you and can help you lose weight safely.  · Follow a healthy meal plan. This includes eating lean proteins, complex carbohydrates, fresh fruits and vegetables, low-fat dairy products, and healthy fats.  ? Follow instructions from your health care provider about eating or drinking  restrictions.  ? Make an appointment to see a diet and nutrition specialist (registered dietitian) to help you create a healthy eating plan that is right for you.  · Do not smoke or use any tobacco products, such as cigarettes, chewing tobacco, and e-cigarettes. If you need help quitting, ask your health care provider.  · Take over-the-counter and prescription medicines as told by your health care provider. You may be prescribed medicines that help lower the risk of type 2 diabetes.  · Keep all follow-up visits as told by your health care provider. This is important.  Summary  · Prediabetes is the condition of having a blood sugar (blood glucose) level that is higher than it should be, but not high enough for you to be diagnosed with type 2 diabetes.  · Having prediabetes puts you at risk for developing type 2 diabetes (type 2 diabetes mellitus).  · To help prevent type 2 diabetes, make lifestyle changes such as being physically active and eating a healthy diet. Lose weight as told by your health care provider.  This information is not intended to replace advice given to you by your health care provider. Make sure you discuss any questions you have with your health care provider.  Document Released: 04/10/2017 Document Revised: 04/10/2020 Document Reviewed: 02/07/2017  [employer] Patient Education © 2020 [employer]    Managing Your Hypertension  Hypertension is commonly called high blood pressure. This is when the force of your blood pressing against the walls of your arteries is too strong. Arteries are blood vessels that carry blood from your heart throughout your body. Hypertension forces the heart to work harder to pump blood, and may cause the arteries to become narrow or stiff. Having untreated or uncontrolled hypertension can cause heart attack, stroke, kidney disease, and other problems.  What are blood pressure readings?  A blood pressure reading consists of a higher number over a lower number. Ideally,  "your blood pressure should be below 120/80. The first (\"top\") number is called the systolic pressure. It is a measure of the pressure in your arteries as your heart beats. The second (\"bottom\") number is called the diastolic pressure. It is a measure of the pressure in your arteries as the heart relaxes.  What does my blood pressure reading mean?  Blood pressure is classified into four stages. Based on your blood pressure reading, your health care provider may use the following stages to determine what type of treatment you need, if any. Systolic pressure and diastolic pressure are measured in a unit called mm Hg.  Normal  · Systolic pressure: below 120.  · Diastolic pressure: below 80.  Elevated  · Systolic pressure: 120-129.  · Diastolic pressure: below 80.  Hypertension stage 1  · Systolic pressure: 130-139.  · Diastolic pressure: 80-89.  Hypertension stage 2  · Systolic pressure: 140 or above.  · Diastolic pressure: 90 or above.  What health risks are associated with hypertension?  Managing your hypertension is an important responsibility. Uncontrolled hypertension can lead to:  · A heart attack.  · A stroke.  · A weakened blood vessel (aneurysm).  · Heart failure.  · Kidney damage.  · Eye damage.  · Metabolic syndrome.  · Memory and concentration problems.  What changes can I make to manage my hypertension?  Hypertension can be managed by making lifestyle changes and possibly by taking medicines. Your health care provider will help you make a plan to bring your blood pressure within a normal range.  Eating and drinking    · Eat a diet that is high in fiber and potassium, and low in salt (sodium), added sugar, and fat. An example eating plan is called the DASH (Dietary Approaches to Stop Hypertension) diet. To eat this way:  ? Eat plenty of fresh fruits and vegetables. Try to fill half of your plate at each meal with fruits and vegetables.  ? Eat whole grains, such as whole wheat pasta, brown rice, or whole " grain bread. Fill about one quarter of your plate with whole grains.  ? Eat low-fat diary products.  ? Avoid fatty cuts of meat, processed or cured meats, and poultry with skin. Fill about one quarter of your plate with lean proteins such as fish, chicken without skin, beans, eggs, and tofu.  ? Avoid premade and processed foods. These tend to be higher in sodium, added sugar, and fat.  · Reduce your daily sodium intake. Most people with hypertension should eat less than 1,500 mg of sodium a day.  · Limit alcohol intake to no more than 1 drink a day for nonpregnant women and 2 drinks a day for men. One drink equals 12 oz of beer, 5 oz of wine, or 1½ oz of hard liquor.  Lifestyle  · Work with your health care provider to maintain a healthy body weight, or to lose weight. Ask what an ideal weight is for you.  · Get at least 30 minutes of exercise that causes your heart to beat faster (aerobic exercise) most days of the week. Activities may include walking, swimming, or biking.  · Include exercise to strengthen your muscles (resistance exercise), such as weight lifting, as part of your weekly exercise routine. Try to do these types of exercises for 30 minutes at least 3 days a week.  · Do not use any products that contain nicotine or tobacco, such as cigarettes and e-cigarettes. If you need help quitting, ask your health care provider.  · Control any long-term (chronic) conditions you have, such as high cholesterol or diabetes.  Monitoring  · Monitor your blood pressure at home as told by your health care provider. Your personal target blood pressure may vary depending on your medical conditions, your age, and other factors.  · Have your blood pressure checked regularly, as often as told by your health care provider.  Working with your health care provider  · Review all the medicines you take with your health care provider because there may be side effects or interactions.  · Talk with your health care provider about  your diet, exercise habits, and other lifestyle factors that may be contributing to hypertension.  · Visit your health care provider regularly. Your health care provider can help you create and adjust your plan for managing hypertension.  Will I need medicine to control my blood pressure?  Your health care provider may prescribe medicine if lifestyle changes are not enough to get your blood pressure under control, and if:  · Your systolic blood pressure is 130 or higher.  · Your diastolic blood pressure is 80 or higher.  Take medicines only as told by your health care provider. Follow the directions carefully. Blood pressure medicines must be taken as prescribed. The medicine does not work as well when you skip doses. Skipping doses also puts you at risk for problems.  Contact a health care provider if:  · You think you are having a reaction to medicines you have taken.  · You have repeated (recurrent) headaches.  · You feel dizzy.  · You have swelling in your ankles.  · You have trouble with your vision.  Get help right away if:  · You develop a severe headache or confusion.  · You have unusual weakness or numbness, or you feel faint.  · You have severe pain in your chest or abdomen.  · You vomit repeatedly.  · You have trouble breathing.  Summary  · Hypertension is when the force of blood pumping through your arteries is too strong. If this condition is not controlled, it may put you at risk for serious complications.  · Your personal target blood pressure may vary depending on your medical conditions, your age, and other factors. For most people, a normal blood pressure is less than 120/80.  · Hypertension is managed by lifestyle changes, medicines, or both. Lifestyle changes include weight loss, eating a healthy, low-sodium diet, exercising more, and limiting alcohol.  This information is not intended to replace advice given to you by your health care provider. Make sure you discuss any questions you have with  "your health care provider.  Document Released: 09/11/2013 Document Revised: 04/10/2020 Document Reviewed: 11/15/2017  [employer]vier Patient Education © 2020 [employer]    DASH Eating Plan  DASH stands for \"Dietary Approaches to Stop Hypertension.\" The DASH eating plan is a healthy eating plan that has been shown to reduce high blood pressure (hypertension). It may also reduce your risk for type 2 diabetes, heart disease, and stroke. The DASH eating plan may also help with weight loss.  What are tips for following this plan?    General guidelines  · Avoid eating more than 2,300 mg (milligrams) of salt (sodium) a day. If you have hypertension, you may need to reduce your sodium intake to 1,500 mg a day.  · Limit alcohol intake to no more than 1 drink a day for nonpregnant women and 2 drinks a day for men. One drink equals 12 oz of beer, 5 oz of wine, or 1½ oz of hard liquor.  · Work with your health care provider to maintain a healthy body weight or to lose weight. Ask what an ideal weight is for you.  · Get at least 30 minutes of exercise that causes your heart to beat faster (aerobic exercise) most days of the week. Activities may include walking, swimming, or biking.  · Work with your health care provider or diet and nutrition specialist (dietitian) to adjust your eating plan to your individual calorie needs.  Reading food labels    · Check food labels for the amount of sodium per serving. Choose foods with less than 5 percent of the Daily Value of sodium. Generally, foods with less than 300 mg of sodium per serving fit into this eating plan.  · To find whole grains, look for the word \"whole\" as the first word in the ingredient list.  Shopping  · Buy products labeled as \"low-sodium\" or \"no salt added.\"  · Buy fresh foods. Avoid canned foods and premade or frozen meals.  Cooking  · Avoid adding salt when cooking. Use salt-free seasonings or herbs instead of table salt or sea salt. Check with your health care provider " or pharmacist before using salt substitutes.  · Do not malik foods. Cook foods using healthy methods such as baking, boiling, grilling, and broiling instead.  · Cook with heart-healthy oils, such as olive, canola, soybean, or sunflower oil.  Meal planning  · Eat a balanced diet that includes:  ? 5 or more servings of fruits and vegetables each day. At each meal, try to fill half of your plate with fruits and vegetables.  ? Up to 6-8 servings of whole grains each day.  ? Less than 6 oz of lean meat, poultry, or fish each day. A 3-oz serving of meat is about the same size as a deck of cards. One egg equals 1 oz.  ? 2 servings of low-fat dairy each day.  ? A serving of nuts, seeds, or beans 5 times each week.  ? Heart-healthy fats. Healthy fats called Omega-3 fatty acids are found in foods such as flaxseeds and coldwater fish, like sardines, salmon, and mackerel.  · Limit how much you eat of the following:  ? Canned or prepackaged foods.  ? Food that is high in trans fat, such as fried foods.  ? Food that is high in saturated fat, such as fatty meat.  ? Sweets, desserts, sugary drinks, and other foods with added sugar.  ? Full-fat dairy products.  · Do not salt foods before eating.  · Try to eat at least 2 vegetarian meals each week.  · Eat more home-cooked food and less restaurant, buffet, and fast food.  · When eating at a restaurant, ask that your food be prepared with less salt or no salt, if possible.  What foods are recommended?  The items listed may not be a complete list. Talk with your dietitian about what dietary choices are best for you.  Grains  Whole-grain or whole-wheat bread. Whole-grain or whole-wheat pasta. Brown rice. Oatmeal. Quinoa. Bulgur. Whole-grain and low-sodium cereals. Barb bread. Low-fat, low-sodium crackers. Whole-wheat flour tortillas.  Vegetables  Fresh or frozen vegetables (raw, steamed, roasted, or grilled). Low-sodium or reduced-sodium tomato and vegetable juice. Low-sodium or  reduced-sodium tomato sauce and tomato paste. Low-sodium or reduced-sodium canned vegetables.  Fruits  All fresh, dried, or frozen fruit. Canned fruit in natural juice (without added sugar).  Meat and other protein foods  Skinless chicken or turkey. Ground chicken or turkey. Pork with fat trimmed off. Fish and seafood. Egg whites. Dried beans, peas, or lentils. Unsalted nuts, nut butters, and seeds. Unsalted canned beans. Lean cuts of beef with fat trimmed off. Low-sodium, lean deli meat.  Dairy  Low-fat (1%) or fat-free (skim) milk. Fat-free, low-fat, or reduced-fat cheeses. Nonfat, low-sodium ricotta or cottage cheese. Low-fat or nonfat yogurt. Low-fat, low-sodium cheese.  Fats and oils  Soft margarine without trans fats. Vegetable oil. Low-fat, reduced-fat, or light mayonnaise and salad dressings (reduced-sodium). Canola, safflower, olive, soybean, and sunflower oils. Avocado.  Seasoning and other foods  Herbs. Spices. Seasoning mixes without salt. Unsalted popcorn and pretzels. Fat-free sweets.  What foods are not recommended?  The items listed may not be a complete list. Talk with your dietitian about what dietary choices are best for you.  Grains  Baked goods made with fat, such as croissants, muffins, or some breads. Dry pasta or rice meal packs.  Vegetables  Creamed or fried vegetables. Vegetables in a cheese sauce. Regular canned vegetables (not low-sodium or reduced-sodium). Regular canned tomato sauce and paste (not low-sodium or reduced-sodium). Regular tomato and vegetable juice (not low-sodium or reduced-sodium). Pickles. Olives.  Fruits  Canned fruit in a light or heavy syrup. Fried fruit. Fruit in cream or butter sauce.  Meat and other protein foods  Fatty cuts of meat. Ribs. Fried meat. Shetty. Sausage. Bologna and other processed lunch meats. Salami. Fatback. Hotdogs. Bratwurst. Salted nuts and seeds. Canned beans with added salt. Canned or smoked fish. Whole eggs or egg yolks. Chicken or turkey  with skin.  Dairy  Whole or 2% milk, cream, and half-and-half. Whole or full-fat cream cheese. Whole-fat or sweetened yogurt. Full-fat cheese. Nondairy creamers. Whipped toppings. Processed cheese and cheese spreads.  Fats and oils  Butter. Stick margarine. Lard. Shortening. Ghee. Shetty fat. Tropical oils, such as coconut, palm kernel, or palm oil.  Seasoning and other foods  Salted popcorn and pretzels. Onion salt, garlic salt, seasoned salt, table salt, and sea salt. Worcestershire sauce. Tartar sauce. Barbecue sauce. Teriyaki sauce. Soy sauce, including reduced-sodium. Steak sauce. Canned and packaged gravies. Fish sauce. Oyster sauce. Cocktail sauce. Horseradish that you find on the shelf. Ketchup. Mustard. Meat flavorings and tenderizers. Bouillon cubes. Hot sauce and Tabasco sauce. Premade or packaged marinades. Premade or packaged taco seasonings. Relishes. Regular salad dressings.  Where to find more information:  · National Heart, [employer], and [employer] [place]: www.nhlbi.nih.gov  · American Heart Association: www.heart.org  Summary  · The DASH eating plan is a healthy eating plan that has been shown to reduce high blood pressure (hypertension). It may also reduce your risk for type 2 diabetes, heart disease, and stroke.  · With the DASH eating plan, you should limit salt (sodium) intake to 2,300 mg a day. If you have hypertension, you may need to reduce your sodium intake to 1,500 mg a day.  · When on the DASH eating plan, aim to eat more fresh fruits and vegetables, whole grains, lean proteins, low-fat dairy, and heart-healthy fats.  · Work with your health care provider or diet and nutrition specialist (dietitian) to adjust your eating plan to your individual calorie needs.  This information is not intended to replace advice given to you by your health care provider. Make sure you discuss any questions you have with your health care provider.  Document Released: 12/06/2012 Document Revised: 11/30/2018  Document Reviewed: 12/11/2017  [employer] Patient Education © 2020 [employer]    Cooking With Less Salt  Cooking with less salt is one way to reduce the amount of sodium you get from food. Depending on your condition and overall health, your health care provider or diet and nutrition specialist (dietitian) may recommend that you reduce your sodium intake. Most people should have less than 2,300 milligrams (mg) of sodium each day. If you have high blood pressure (hypertension), you may need to limit your sodium to 1,500 mg each day. Follow the tips below to help reduce your sodium intake.  What do I need to know about cooking with less salt?  Shopping  · Buy sodium-free or low-sodium products. Look for the following words on food labels:  ? Low-sodium.  ? Sodium-free.  ? Reduced-sodium.  ? No salt added.  ? Unsalted.  · Buy fresh or frozen vegetables. Avoid canned vegetables.  · Avoid buying meats or protein foods that have been injected with broth or saline solution.  · Avoid cured or smoked meats, such as hot dogs, cruz, salami, ham, and bologna.  Reading food labels    · Check the food label before buying or using packaged ingredients.  · Look for products with no more than 140 mg of sodium in one serving.  · Do not choose foods with salt as one of the first three ingredients on the ingredients list. If salt is one of the first three ingredients, it usually means the item is high in sodium, because ingredients are listed in order of amount in the food item.  Cooking  · Use herbs, seasonings without salt, and spices as substitutes for salt in foods.  · Use sodium-free baking soda when baking.  · Elnora, braise, or roast foods to add flavor with less salt.  · Avoid adding salt to pasta, rice, or hot cereals while cooking.  · Drain and rinse canned vegetables before use.  · Avoid adding salt when cooking sweets and desserts.  · Cook with low-sodium ingredients.  What are some salt alternatives?  The following are  herbs, seasonings, and spices that can be used instead of salt to give taste to your food. Herbs should be fresh or dried. Do not choose packaged mixes. Next to the name of the herb, spice, or seasoning are some examples of foods you can pair it with.  Herbs  · Bay leaves - Soups, meat and vegetable dishes, and spaghetti sauce.  · Basil - Italian dishes, soups, pasta, and fish dishes.  · Cilantro - Meat, poultry, and vegetable dishes.  · Chili powder - Marinades and Mexican dishes.  · Chives - Salad dressings and potato dishes.  · Cumin - Mexican dishes, couscous, and meat dishes.  · Dill - Fish dishes, sauces, and salads.  · Fennel - Meat and vegetable dishes, breads, and cookies.  · Garlic (do not use garlic salt) - Italian dishes, meat dishes, salad dressings, and sauces.  · Marjoram - Soups, potato dishes, and meat dishes.  · Oregano - Pizza and spaghetti sauce.  · Parsley - Salads, soups, pasta, and meat dishes.  · Flora - Italian dishes, salad dressings, soups, and red meats.  · Saffron - Fish dishes, pasta, and some poultry dishes.  · Abraham - Stuffings and sauces.  · Tarragon - Fish and poultry dishes.  · Thyme - Stuffing, meat, and fish dishes.  Seasonings  · Lemon juice - Fish dishes, poultry dishes, vegetables, and salads.  · Vinegar - Salad dressings, vegetables, and fish dishes.  Spices  · Cinnamon - Sweet dishes, such as cakes, cookies, and puddings.  · Cloves - Gingerbread, puddings, and marinades for meats.  · Ty - Vegetable dishes, fish and poultry dishes, and stir-malik dishes.  · Keri - Vegetables dishes, fish dishes, and stir-malik dishes.  · Nutmeg - Pasta, vegetables, poultry, fish dishes, and custard.  What are some low-sodium ingredients and foods?  · Fresh or frozen fruits and vegetables with no sauce added.  · Fresh or frozen whole meats, poultry, and fish with no sauce added.  · Eggs.  · Noodles, pasta, quinoa, rice.  · Shredded or puffed wheat or puffed rice.  · Regular or quick  oats.  · Milk, yogurt, hard cheeses, and low-sodium cheeses. Good cheese choices include Swiss, Baker Donn, and mozzarella. Always check the label for the serving size and sodium content.  · Unsalted butter or margarine.  · Unsalted nuts.  · Sherbet or ice cream (keep to ½ cup per serving).  · Homemade pudding.  · Sodium-free baking soda and baking powder.  This is not a complete list of low-sodium ingredients and foods. Contact your dietitian for more options.  Summary  · Cooking with less salt is one way to reduce the amount of sodium that you get from food.  · Buy sodium-free or low-sodium products.  · Check the food label before using or buying packaged ingredients.  · Use herbs, seasonings without salt, and spices as substitutes for salt in foods.  This information is not intended to replace advice given to you by your health care provider. Make sure you discuss any questions you have with your health care provider.  Document Released: 12/18/2006 Document Revised: 11/30/2018 Document Reviewed: 12/26/2017  [employer] Patient Education © 2020 [employer]    Calorie Counting for Weight Loss  Calories are units of energy. Your body needs a certain amount of calories from food to keep you going throughout the day. When you eat more calories than your body needs, your body stores the extra calories as fat. When you eat fewer calories than your body needs, your body burns fat to get the energy it needs.  Calorie counting means keeping track of how many calories you eat and drink each day. Calorie counting can be helpful if you need to lose weight. If you make sure to eat fewer calories than your body needs, you should lose weight. Ask your health care provider what a healthy weight is for you.  For calorie counting to work, you will need to eat the right number of calories in a day in order to lose a healthy amount of weight per week. A dietitian can help you determine how many calories you need in a day and will  give you suggestions on how to reach your calorie goal.  · A healthy amount of weight to lose per week is usually 1-2 lb (0.5-0.9 kg). This usually means that your daily calorie intake should be reduced by 500-750 calories.  · Eating 1,200 - 1,500 calories per day can help most women lose weight.  · Eating 1,500 - 1,800 calories per day can help most men lose weight.  What is my plan?  My goal is to have __________ calories per day.  If I have this many calories per day, I should lose around __________ pounds per week.  What do I need to know about calorie counting?  In order to meet your daily calorie goal, you will need to:  · Find out how many calories are in each food you would like to eat. Try to do this before you eat.  · Decide how much of the food you plan to eat.  · Write down what you ate and how many calories it had. Doing this is called keeping a food log.  To successfully lose weight, it is important to balance calorie counting with a healthy lifestyle that includes regular activity. Aim for 150 minutes of moderate exercise (such as walking) or 75 minutes of vigorous exercise (such as running) each week.  Where do I find calorie information?    The number of calories in a food can be found on a Nutrition Facts label. If a food does not have a Nutrition Facts label, try to look up the calories online or ask your dietitian for help.  Remember that calories are listed per serving. If you choose to have more than one serving of a food, you will have to multiply the calories per serving by the amount of servings you plan to eat. For example, the label on a package of bread might say that a serving size is 1 slice and that there are 90 calories in a serving. If you eat 1 slice, you will have eaten 90 calories. If you eat 2 slices, you will have eaten 180 calories.  How do I keep a food log?  Immediately after each meal, record the following information in your food log:  · What you ate. Don't forget to  "include toppings, sauces, and other extras on the food.  · How much you ate. This can be measured in cups, ounces, or number of items.  · How many calories each food and drink had.  · The total number of calories in the meal.  Keep your food log near you, such as in a small notebook in your pocket, or use a mobile carl or website. Some programs will calculate calories for you and show you how many calories you have left for the day to meet your goal.  What are some calorie counting tips?    · Use your calories on foods and drinks that will fill you up and not leave you hungry:  ? Some examples of foods that fill you up are nuts and nut butters, vegetables, lean proteins, and high-fiber foods like whole grains. High-fiber foods are foods with more than 5 g fiber per serving.  ? Drinks such as sodas, specialty coffee drinks, alcohol, and juices have a lot of calories, yet do not fill you up.  · Eat nutritious foods and avoid empty calories. Empty calories are calories you get from foods or beverages that do not have many vitamins or protein, such as candy, sweets, and soda. It is better to have a nutritious high-calorie food (such as an avocado) than a food with few nutrients (such as a bag of chips).  · Know how many calories are in the foods you eat most often. This will help you calculate calorie counts faster.  · Pay attention to calories in drinks. Low-calorie drinks include water and unsweetened drinks.  · Pay attention to nutrition labels for \"low fat\" or \"fat free\" foods. These foods sometimes have the same amount of calories or more calories than the full fat versions. They also often have added sugar, starch, or salt, to make up for flavor that was removed with the fat.  · Find a way of tracking calories that works for you. Get creative. Try different apps or programs if writing down calories does not work for you.  What are some portion control tips?  · Know how many calories are in a serving. This will help " you know how many servings of a certain food you can have.  · Use a measuring cup to measure serving sizes. You could also try weighing out portions on a kitchen scale. With time, you will be able to estimate serving sizes for some foods.  · Take some time to put servings of different foods on your favorite plates, bowls, and cups so you know what a serving looks like.  · Try not to eat straight from a bag or box. Doing this can lead to overeating. Put the amount you would like to eat in a cup or on a plate to make sure you are eating the right portion.  · Use smaller plates, glasses, and bowls to prevent overeating.  · Try not to multitask (for example, watch TV or use your computer) while eating. If it is time to eat, sit down at a table and enjoy your food. This will help you to know when you are full. It will also help you to be aware of what you are eating and how much you are eating.  What are tips for following this plan?  Reading food labels  · Check the calorie count compared to the serving size. The serving size may be smaller than what you are used to eating.  · Check the source of the calories. Make sure the food you are eating is high in vitamins and protein and low in saturated and trans fats.  Shopping  · Read nutrition labels while you shop. This will help you make healthy decisions before you decide to purchase your food.  · Make a grocery list and stick to it.  Cooking  · Try to cook your favorite foods in a healthier way. For example, try baking instead of frying.  · Use low-fat dairy products.  Meal planning  · Use more fruits and vegetables. Half of your plate should be fruits and vegetables.  · Include lean proteins like poultry and fish.  How do I count calories when eating out?  · Ask for smaller portion sizes.  · Consider sharing an entree and sides instead of getting your own entree.  · If you get your own entree, eat only half. Ask for a box at the beginning of your meal and put the rest  "of your entree in it so you are not tempted to eat it.  · If calories are listed on the menu, choose the lower calorie options.  · Choose dishes that include vegetables, fruits, whole grains, low-fat dairy products, and lean protein.  · Choose items that are boiled, broiled, grilled, or steamed. Stay away from items that are buttered, battered, fried, or served with cream sauce. Items labeled \"crispy\" are usually fried, unless stated otherwise.  · Choose water, low-fat milk, unsweetened iced tea, or other drinks without added sugar. If you want an alcoholic beverage, choose a lower calorie option such as a glass of wine or light beer.  · Ask for dressings, sauces, and syrups on the side. These are usually high in calories, so you should limit the amount you eat.  · If you want a salad, choose a garden salad and ask for grilled meats. Avoid extra toppings like cruz, cheese, or fried items. Ask for the dressing on the side, or ask for olive oil and vinegar or lemon to use as dressing.  · Estimate how many servings of a food you are given. For example, a serving of cooked rice is ½ cup or about the size of half a baseball. Knowing serving sizes will help you be aware of how much food you are eating at restaurants. The list below tells you how big or small some common portion sizes are based on everyday objects:  ? 1 oz--4 stacked dice.  ? 3 oz--1 deck of cards.  ? 1 tsp--1 die.  ? 1 Tbsp--½ a ping-pong ball.  ? 2 Tbsp--1 ping-pong ball.  ? ½ cup--½ baseball.  ? 1 cup--1 baseball.  Summary  · Calorie counting means keeping track of how many calories you eat and drink each day. If you eat fewer calories than your body needs, you should lose weight.  · A healthy amount of weight to lose per week is usually 1-2 lb (0.5-0.9 kg). This usually means reducing your daily calorie intake by 500-750 calories.  · The number of calories in a food can be found on a Nutrition Facts label. If a food does not have a Nutrition Facts " label, try to look up the calories online or ask your dietitian for help.  · Use your calories on foods and drinks that will fill you up, and not on foods and drinks that will leave you hungry.  · Use smaller plates, glasses, and bowls to prevent overeating.  This information is not intended to replace advice given to you by your health care provider. Make sure you discuss any questions you have with your health care provider.  Document Released: 12/18/2006 Document Revised: 09/06/2019 Document Reviewed: 11/17/2017  Elsevier Patient Education © 2020 Else[employer] Inc.

## 2020-08-31 ENCOUNTER — OFFICE VISIT (OUTPATIENT)
Dept: OTOLARYNGOLOGY | Facility: CLINIC | Age: 65
End: 2020-08-31

## 2020-08-31 VITALS
HEART RATE: 65 BPM | DIASTOLIC BLOOD PRESSURE: 79 MMHG | TEMPERATURE: 98 F | BODY MASS INDEX: 29.47 KG/M2 | RESPIRATION RATE: 20 BRPM | HEIGHT: 69 IN | SYSTOLIC BLOOD PRESSURE: 145 MMHG | WEIGHT: 199 LBS

## 2020-08-31 DIAGNOSIS — D22.9 BENIGN NEVUS: ICD-10-CM

## 2020-08-31 DIAGNOSIS — D03.39 MELANOMA IN SITU OF CHEEK (HCC): Primary | ICD-10-CM

## 2020-08-31 PROCEDURE — 99213 OFFICE O/P EST LOW 20 MIN: CPT | Performed by: OTOLARYNGOLOGY

## 2020-08-31 NOTE — PATIENT INSTRUCTIONS
CONTACT INFORMATION:  The main office phone number is [phone]. For emergencies after hours and on weekends, this number will convert over to our answering service and the on call provider will answer. Please try to keep non emergent phone calls/ questions to office hours 9am-5pm Monday through Friday.     [employer]  As an alternative, you can sign up and use the Epic MyChart system for more direct and quicker access for non emergent questions/ problems.  [facility] [employer] allows you to send messages to your doctor, view your test results, renew your prescriptions, schedule appointments, and more. To sign up, go to [email or web address] and click on the Sign Up Now link in the New User? box. Enter your [employer] Activation Code exactly as it appears below along with the last four digits of your Social Security Number and your Date of Birth () to complete the sign-up process. If you do not sign up before the expiration date, you must request a new code.    [employer] Activation Code: Activation code not generated  Current [employer] Status: Active    If you have questions, you can email [email or web address] or call [phone] to talk to our [employer] staff. Remember, [employer] is NOT to be used for urgent needs. For medical emergencies, dial 911.

## 2020-08-31 NOTE — PROGRESS NOTES
Chief Complaint   Patient presents with   • Follow-up     1) Melanoma left cheek  2) Left nasal tip lesion  3) Left chin lesion  4) Forehead lesion  5) Right posterior scalp lesion       Skin Cancer Follow-up and Surveillance Visit    CC/Reason for visit: Israel Campos Jr. 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    Subjective: Since surgery, He is doing well without complaints.  Symptoms denied postoperatively include pain, fever, chills, bleeding and drainage. The patient states the pain has ceased since surgery.  He saw Dr. Eva Ley last week, and has scheduled follow-up every 3 months.    Patient presents for follow-up general head and neck skin examination.  Patient has a history of malignant melanoma. He has not noted recurrence.  The patient has not noted new worrisome lesions.    He has complaints of a skin lesion on the right nasal tip has been that has been present for the last year and is enlarging. Nothing makes this better or worse. 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  This is moderate in intensity.  The outward appearance of this bothers him, and he would like it removed for cosmetic reasons.  He has similar lesions of the right posterior scalp, left chin, and forehead that bother him from a cosmetic standpoint.     Pathology review: Pathology is available. Pathology demonstrates a diagnosis of malignant melanoma in situ, lentigo maligna type with clear margins    Dermatologist:  Eva Ley MD      Past Medical History:   Diagnosis Date   • Atrial fibrillation (CMS/HCC)    • Cancer (CMS/HCC)     SKIN CA   • GERD (gastroesophageal reflux disease)    • Hard to intubate     NARROW   • Hypertension    • Melanoma in situ of cheek (CMS/HCC)      Past Surgical History:   Procedure Laterality Date   • HEAD/NECK LESION/CYST EXCISION Left 10/29/2019     Procedure: Excision of skin lesion of the left cheek with linear closure;  Surgeon: Giancarlo Lacy MD;  Location: [facility] OR;  Service: ENT   • HERNIA REPAIR     • OTHER SURGICAL HISTORY Right     SCAR TISSUE REMOVED FROM R AXILLA     Family History   Problem Relation Age of Onset   • Cancer Mother    • Stroke Father    • Diabetes Maternal Grandfather      Social History     Tobacco Use   • Smoking status: Never Smoker   • Smokeless tobacco: Never Used   Substance Use Topics   • Alcohol use: Yes     Alcohol/week: 14.0 standard drinks     Types: 14 Shots of liquor per week     Comment: WIFE REPORTS DISCREETLY PT HAS 2 DRINKS PER NIGHT   • Drug use: No     Allergies:  Shellfish-derived products; Azithromycin; Iodine; and Sudafed  [pseudoephedrine hcl]    Current Outpatient Medications:   •  albuterol sulfate  (90 Base) MCG/ACT inhaler, Inhale 2 puffs Daily As Needed for Shortness of Air., Disp: , Rfl:   •  ALPRAZolam (XANAX) 0.25 MG tablet, Take 1-2 tablets daily as needed for anxiety, Disp: 180 tablet, Rfl: 0  •  amLODIPine (NORVASC) 5 MG tablet, Take 1 tablet by mouth Daily., Disp: 90 tablet, Rfl: 1  •  aspirin 81 MG tablet, Take 1 tablet by mouth Daily., Disp: , Rfl:   •  atenolol (TENORMIN) 50 MG tablet, Take 1 tablet by mouth Daily., Disp: 90 tablet, Rfl: 2  •  escitalopram (LEXAPRO) 10 MG tablet, Take 1 tablet by mouth Daily., Disp: 90 tablet, Rfl: 2  •  fexofenadine (ALLEGRA) 180 MG tablet, Take 180 mg by mouth Daily., Disp: , Rfl:   •  guaiFENesin (MUCINEX) 600 MG 12 hr tablet, Take 1,200 mg by mouth Daily., Disp: , Rfl:   •  losartan (COZAAR) 100 MG tablet, Take 1 tablet by mouth Daily., Disp: 90 tablet, Rfl: 2  •  methylcellulose, Laxative, (CITRUCEL) 500 MG tablet tablet, Take 2 tablets by mouth Daily., Disp: , Rfl:   •  omeprazole (priLOSEC) 20 MG capsule, Take 1 capsule by mouth Daily., Disp: 90 capsule, Rfl: 2  •  simvastatin (ZOCOR) 40 MG tablet, Take 1 tablet by mouth Daily., Disp: 90 tablet,  "Rfl: 1    Review of Systems   Constitutional: Negative for activity change, appetite change, chills, fatigue, fever and unexpected weight change.   HENT: Negative for congestion, dental problem, facial swelling and nosebleeds.    Eyes: Negative for discharge and redness.   Musculoskeletal: Negative for neck pain.   Skin: Positive for wound. Negative for color change, pallor and rash.   Neurological: Positive for facial asymmetry.   Hematological: Negative for adenopathy. Bruises/bleeds easily.   Psychiatric/Behavioral: The patient is nervous/anxious.            Objective:  /79   Pulse 65   Temp 98 °F (36.7 °C)   Resp 20   Ht 175.3 cm (69\")   Wt 90.3 kg (199 lb)   BMI 29.39 kg/m²     Physical Exam   Constitutional: He is oriented to person, place, and time. He appears well-developed and well-nourished. He is cooperative. No distress.   HENT:   Head: Normocephalic and atraumatic.       Right Ear: External ear normal.   Left Ear: External ear normal.   Nose: Nose normal.       Eyes: Pupils are equal, round, and reactive to light. Conjunctivae and EOM are normal. Right eye exhibits no discharge. Left eye exhibits no discharge. No scleral icterus.   Neck: Normal range of motion. Neck supple. No JVD present. No tracheal deviation present. No thyromegaly present.   Pulmonary/Chest: Effort normal. No stridor.   Musculoskeletal: Normal range of motion. He exhibits no edema or deformity.   Lymphadenopathy:     He has no cervical adenopathy.   Neurological: He is alert and oriented to person, place, and time. He has normal strength. No cranial nerve deficit. Coordination normal.   Skin: Skin is warm and dry. No rash noted. He is not diaphoretic. No erythema. No pallor.   Psychiatric: He has a normal mood and affect. His speech is normal and behavior is normal. Judgment and thought content normal. Cognition and memory are normal.   Nursing note and vitals reviewed.    Data review:    I have reviewed the pathology " from the left cheek excision and the nasal biopsy:          Assessment:  Israel was seen today for follow-up.    Diagnoses and all orders for this visit:    Melanoma in situ of cheek (CMS/HCC)    Benign nevus        Plan:    We have discussed possible excision of the benign nevus of the right nasal tip with bilobe flap versus cheek advancement  in the office under local anesthesia and permanent section analysis. He is not ready to proceed with excision yet, but he will call should he decide to schedule this - thinking winter time.  He is also interested in excision of the chin, forehead, and right posterior scalp lesions at the same time.  He will call to schedule as an office procedure.      Discussion of skin lesion. Discussed risks, benefits, alternatives, and possible complications of excision of the skin lesion with reconstruction utilizing local tissue rearrangement, full-thickness skin grafting, or local interpolated flaps. Risks include, but are not limited too: bleeding, infection, hematoma, recurrence, need for additional procedures, flap failure, cosmetic deformity. Patient understands risks and would like to proceed with surgery.     Protect the incisions from sunlight. Sunlight to the incisions will cause permanent pigmentation to the incision line and make the incision more noticeable. After the incision has reepithelialized (typically 2-3 weeks after the procedure), you may begin to use sunscreen with an SPF of 15 or greater    He sees Dr. JJ Ley every 3 months.     Giancarlo Lacy MD  08/31/20  16:32

## 2020-09-07 DIAGNOSIS — F41.9 ANXIETY: ICD-10-CM

## 2020-09-08 RX ORDER — ALPRAZOLAM 0.25 MG/1
TABLET ORAL
Qty: 180 TABLET | Refills: 0 | Status: SHIPPED | OUTPATIENT
Start: 2020-09-08 | End: 2021-01-24 | Stop reason: SDUPTHER

## 2020-11-11 DIAGNOSIS — I10 ESSENTIAL HYPERTENSION: ICD-10-CM

## 2020-11-11 RX ORDER — LOSARTAN POTASSIUM 100 MG/1
TABLET ORAL
Qty: 90 TABLET | Refills: 3 | Status: SHIPPED | OUTPATIENT
Start: 2020-11-11 | End: 2021-11-05 | Stop reason: SDUPTHER

## 2020-12-29 DIAGNOSIS — I10 ESSENTIAL HYPERTENSION: ICD-10-CM

## 2020-12-29 RX ORDER — ATENOLOL 50 MG/1
50 TABLET ORAL DAILY
Qty: 90 TABLET | Refills: 2 | Status: SHIPPED | OUTPATIENT
Start: 2020-12-29 | End: 2021-08-29 | Stop reason: SDUPTHER

## 2021-01-04 DIAGNOSIS — E78.2 MIXED HYPERLIPIDEMIA: ICD-10-CM

## 2021-01-04 RX ORDER — SIMVASTATIN 40 MG
40 TABLET ORAL DAILY
Qty: 90 TABLET | Refills: 1 | Status: SHIPPED | OUTPATIENT
Start: 2021-01-04 | End: 2021-07-11 | Stop reason: SDUPTHER

## 2021-01-24 DIAGNOSIS — F41.9 ANXIETY: ICD-10-CM

## 2021-01-25 RX ORDER — ALPRAZOLAM 0.25 MG/1
TABLET ORAL
Qty: 180 TABLET | Refills: 0 | Status: SHIPPED | OUTPATIENT
Start: 2021-01-25 | End: 2021-06-10

## 2021-04-12 ENCOUNTER — LAB (OUTPATIENT)
Dept: LAB | Facility: HOSPITAL | Age: 66
End: 2021-04-12

## 2021-04-12 DIAGNOSIS — R73.03 PREDIABETES: ICD-10-CM

## 2021-04-12 DIAGNOSIS — E78.2 MIXED HYPERLIPIDEMIA: ICD-10-CM

## 2021-04-12 LAB
CHOLEST SERPL-MCNC: 207 MG/DL (ref 0–200)
HBA1C MFR BLD: 5.46 % (ref 4.8–5.6)
HDLC SERPL-MCNC: 47 MG/DL (ref 40–60)
LDLC SERPL CALC-MCNC: 136 MG/DL (ref 0–100)
LDLC/HDLC SERPL: 2.84 {RATIO}
TRIGL SERPL-MCNC: 132 MG/DL (ref 0–150)
VLDLC SERPL-MCNC: 24 MG/DL (ref 5–40)

## 2021-04-12 PROCEDURE — 83036 HEMOGLOBIN GLYCOSYLATED A1C: CPT

## 2021-04-12 PROCEDURE — 36415 COLL VENOUS BLD VENIPUNCTURE: CPT

## 2021-04-12 PROCEDURE — 80061 LIPID PANEL: CPT

## 2021-04-13 ENCOUNTER — OFFICE VISIT (OUTPATIENT)
Dept: INTERNAL MEDICINE | Facility: CLINIC | Age: 66
End: 2021-04-13

## 2021-04-13 VITALS
SYSTOLIC BLOOD PRESSURE: 124 MMHG | TEMPERATURE: 98.5 F | OXYGEN SATURATION: 98 % | WEIGHT: 202 LBS | BODY MASS INDEX: 29.92 KG/M2 | HEART RATE: 81 BPM | RESPIRATION RATE: 15 BRPM | DIASTOLIC BLOOD PRESSURE: 70 MMHG | HEIGHT: 69 IN

## 2021-04-13 DIAGNOSIS — F32.A DEPRESSION, UNSPECIFIED DEPRESSION TYPE: ICD-10-CM

## 2021-04-13 DIAGNOSIS — E78.2 MIXED HYPERLIPIDEMIA: Primary | ICD-10-CM

## 2021-04-13 DIAGNOSIS — Z12.5 PROSTATE CANCER SCREENING: ICD-10-CM

## 2021-04-13 DIAGNOSIS — I10 ESSENTIAL HYPERTENSION: ICD-10-CM

## 2021-04-13 DIAGNOSIS — Z00.00 HEALTHCARE MAINTENANCE: ICD-10-CM

## 2021-04-13 PROCEDURE — 99214 OFFICE O/P EST MOD 30 MIN: CPT | Performed by: INTERNAL MEDICINE

## 2021-04-13 RX ORDER — ESCITALOPRAM OXALATE 10 MG/1
10 TABLET ORAL DAILY
Qty: 90 TABLET | Refills: 2 | Status: SHIPPED | OUTPATIENT
Start: 2021-04-13

## 2021-04-13 NOTE — PROGRESS NOTES
Chief Complaint   Patient presents with   • Follow-up   • Hypertension         History:  Israel Campos Jr. is a 65 y.o. male who presents today for evaluation of the above problems.    6 month follow up    His blood pressure is at goal.  His recent labs showed A1c of 5.46.  Is cholesterol has improved as well with a total cholesterol now 207 with an LDL of 136 from 142 in August 2020.  We increased his Zocor to 40 mg in August 2020.    He tested positive for COVID-19 January 25, 2021 but never got sick.    He is hopeful to lose some weight with walking.    He has a draining sebaceous cyst on his back and sees Dr. Ley' PA.  It was drained in the office and he is applying heat and antibiotic ointment.  He does have an appointment on 8 May 2018 to get it removed.    He is concerned about possibility of genital warts.  He has noticed couple areas on the shaft of his penis which are asymptomatic and slightly raised.  Reports a dermatologist gave him some medication but that did not help.  They also offered possibility of burning them off    He is having some mild issues with dripping of urine after urination.  He does not want to start any additional medication at this time but we discussed adding Saw Cooter      HPI     The 10-year ASCVD risk score (Merly JAVIER Jr., et al., 2013) is: 14.8%    Values used to calculate the score:      Age: 65 years      Sex: Male      Is Non- : No      Diabetic: No      Tobacco smoker: No      Systolic Blood Pressure: 124 mmHg      Is BP treated: Yes      HDL Cholesterol: 47 mg/dL      Total Cholesterol: 207 mg/dL  From 20.7% August 2020    ROS:  Review of Systems   Constitutional: Negative for fatigue and fever.   Respiratory: Negative for cough and shortness of breath.    Cardiovascular: Negative for chest pain.   Musculoskeletal: Negative for myalgias.   Skin: Positive for color change and wound.         Current Outpatient Medications:   •  albuterol sulfate   (90 Base) MCG/ACT inhaler, Inhale 2 puffs Daily As Needed for Shortness of Air., Disp: , Rfl:   •  ALPRAZolam (XANAX) 0.25 MG tablet, Take 1-2 tablets daily as needed for anxiety, Disp: 180 tablet, Rfl: 0  •  amLODIPine (NORVASC) 5 MG tablet, Take 1 tablet by mouth Daily., Disp: 90 tablet, Rfl: 1  •  aspirin 81 MG tablet, Take 1 tablet by mouth Daily., Disp: , Rfl:   •  atenolol (TENORMIN) 50 MG tablet, Take 1 tablet by mouth Daily., Disp: 90 tablet, Rfl: 2  •  fexofenadine (ALLEGRA) 180 MG tablet, Take 180 mg by mouth Daily., Disp: , Rfl:   •  guaiFENesin (MUCINEX) 600 MG 12 hr tablet, Take 1,200 mg by mouth Daily., Disp: , Rfl:   •  losartan (COZAAR) 100 MG tablet, Take 1 tablet by mouth once daily, Disp: 90 tablet, Rfl: 3  •  methylcellulose, Laxative, (CITRUCEL) 500 MG tablet tablet, Take 2 tablets by mouth Daily., Disp: , Rfl:   •  omeprazole (priLOSEC) 20 MG capsule, Take 1 capsule by mouth Daily., Disp: 90 capsule, Rfl: 2  •  simvastatin (ZOCOR) 40 MG tablet, Take 1 tablet by mouth Daily., Disp: 90 tablet, Rfl: 1  •  escitalopram (LEXAPRO) 10 MG tablet, Take 1 tablet by mouth Daily., Disp: 90 tablet, Rfl: 2    Lab Results   Component Value Date    GLUCOSE 117 (H) 08/20/2020    BUN 15 08/20/2020    CREATININE 0.97 08/20/2020    EGFRIFNONA 78 08/20/2020    BCR 15.5 08/20/2020    K 4.4 08/20/2020    CO2 24.0 08/20/2020    CALCIUM 9.7 08/20/2020    ALBUMIN 4.70 08/20/2020    AST 20 08/20/2020    ALT 24 08/20/2020       WBC   Date Value Ref Range Status   08/20/2020 8.38 3.40 - 10.80 10*3/mm3 Final   07/12/2018 7.6 4.8 - 10.8 K/uL Final     RBC   Date Value Ref Range Status   08/20/2020 4.69 4.14 - 5.80 10*6/mm3 Final   07/12/2018 4.85 4.70 - 6.10 M/uL Final     Hemoglobin   Date Value Ref Range Status   08/20/2020 13.4 13.0 - 17.7 g/dL Final   07/12/2018 13.5 (L) 14.0 - 18.0 g/dL Final     Hematocrit   Date Value Ref Range Status   08/20/2020 40.8 37.5 - 51.0 % Final   07/12/2018 41.5 (L) 42.0 - 52.0 %  Final     MCV   Date Value Ref Range Status   08/20/2020 87.0 79.0 - 97.0 fL Final   07/12/2018 85.6 80.0 - 94.0 fL Final     MCH   Date Value Ref Range Status   08/20/2020 28.6 26.6 - 33.0 pg Final   07/12/2018 27.8 27.0 - 31.0 pg Final     MCHC   Date Value Ref Range Status   08/20/2020 32.8 31.5 - 35.7 g/dL Final   07/12/2018 32.5 (L) 33.0 - 37.0 g/dL Final     RDW   Date Value Ref Range Status   08/20/2020 12.6 12.3 - 15.4 % Final   07/12/2018 12.1 11.5 - 14.5 % Final     RDW-SD   Date Value Ref Range Status   08/20/2020 39.1 37.0 - 54.0 fl Final     MPV   Date Value Ref Range Status   08/20/2020 11.6 6.0 - 12.0 fL Final   07/12/2018 10.7 9.4 - 12.4 fL Final     Platelets   Date Value Ref Range Status   08/20/2020 212 140 - 450 10*3/mm3 Final   07/12/2018 216 130 - 400 K/uL Final     Neutrophil %   Date Value Ref Range Status   08/20/2020 53.0 42.7 - 76.0 % Final     Lymphocyte %   Date Value Ref Range Status   08/20/2020 23.3 19.6 - 45.3 % Final     Monocyte %   Date Value Ref Range Status   08/20/2020 11.8 5.0 - 12.0 % Final     Eosinophil %   Date Value Ref Range Status   08/20/2020 10.0 (H) 0.3 - 6.2 % Final     Basophil %   Date Value Ref Range Status   08/20/2020 1.4 0.0 - 1.5 % Final     Immature Grans %   Date Value Ref Range Status   08/20/2020 0.5 0.0 - 0.5 % Final     Neutrophils, Absolute   Date Value Ref Range Status   08/20/2020 4.44 1.70 - 7.00 10*3/mm3 Final     Lymphocytes, Absolute   Date Value Ref Range Status   08/20/2020 1.95 0.70 - 3.10 10*3/mm3 Final     Monocytes, Absolute   Date Value Ref Range Status   08/20/2020 0.99 (H) 0.10 - 0.90 10*3/mm3 Final     Eosinophils, Absolute   Date Value Ref Range Status   08/20/2020 0.84 (H) 0.00 - 0.40 10*3/mm3 Final     Basophils, Absolute   Date Value Ref Range Status   08/20/2020 0.12 0.00 - 0.20 10*3/mm3 Final     Immature Grans, Absolute   Date Value Ref Range Status   08/20/2020 0.04 0.00 - 0.05 10*3/mm3 Final     nRBC   Date Value Ref Range  "Status   08/20/2020 0.0 0.0 - 0.2 /100 WBC Final         OBJECTIVE:  Visit Vitals  /70 (BP Location: Left arm, Patient Position: Sitting, Cuff Size: Adult)   Pulse 81   Temp 98.5 °F (36.9 °C) (Oral)   Resp 15   Ht 175.3 cm (69.02\")   Wt 91.6 kg (202 lb)   SpO2 98%   BMI 29.82 kg/m²      Physical Exam  Vitals reviewed.   Constitutional:       General: He is not in acute distress.     Appearance: Normal appearance. He is well-developed.      Comments: Pleasant   HENT:      Head: Normocephalic and atraumatic.   Eyes:      Pupils: Pupils are equal, round, and reactive to light.   Neck:      Thyroid: No thyromegaly.      Trachea: Phonation normal.   Pulmonary:      Effort: No respiratory distress.   Genitourinary:     Comments: He he has 3 or 4 slightly hyperpigmented and slightly raised areas on the shaft of his penis appearing similar to SKs.  Do not appear to be verruca  Skin:     Coloration: Skin is not pale.      Findings: No erythema.      Comments: On his mid upper thoracic back he has a drainage sebaceous cyst.  I was able to express some purulent material from the cyst.   Neurological:      Mental Status: He is alert.   Psychiatric:         Behavior: Behavior normal.         Thought Content: Thought content normal.         Judgment: Judgment normal.         Assessment/Plan    Diagnoses and all orders for this visit:    1. Mixed hyperlipidemia (Primary)    2. Depression, unspecified depression type  -     escitalopram (LEXAPRO) 10 MG tablet; Take 1 tablet by mouth Daily.  Dispense: 90 tablet; Refill: 2    3. Essential hypertension    4. Healthcare maintenance  -     Lipid Panel; Future  -     Hemoglobin A1c; Future  -     Comprehensive Metabolic Panel; Future  -     CBC & Differential; Future    5. Prostate cancer screening  -     PSA Screen; Future      His cholesterol is improved but still higher than I would like with an 10-year ASCVD risk of 14%.  We discussed the possibility of increasing his Zocor to " 80 mg daily.  Also discussed the option of adjusting diet and exercise to try to control cholesterol better with lifestyle modification.  He is opted to try lifestyle modification first and if still uncontrolled at the next visit will increase his Zocor to 80 mg daily.    Refill his Lexapro    I have ordered labs for him to have done a couple days prior to his next visit in 6 months which will be his annual physical.    Return in about 6 months (around 10/13/2021) for Annual physical.    Patient was given instructions and counseling regarding his/her condition or for health maintenance advice. Please see specific information pulled into the AVS if appropriate.     FERMIN Garcia MD   16:01 CDT  4/13/2021

## 2021-06-07 NOTE — TELEPHONE ENCOUNTER
I have contacted patient and left a message for him to call me back ,to find a different date and time for follow up sincve he will be out of town for work on his scheduled appointment with Dr Lacy. Waiting on call back.   Eye Clamp Note Details: An eye clamp was used during the procedure.

## 2021-06-10 DIAGNOSIS — F41.9 ANXIETY: ICD-10-CM

## 2021-06-10 DIAGNOSIS — I10 ESSENTIAL HYPERTENSION: ICD-10-CM

## 2021-06-10 RX ORDER — AMLODIPINE BESYLATE 5 MG/1
TABLET ORAL
Qty: 90 TABLET | Refills: 2 | Status: SHIPPED | OUTPATIENT
Start: 2021-06-10

## 2021-06-10 RX ORDER — ALPRAZOLAM 0.25 MG/1
TABLET ORAL
Qty: 180 TABLET | Refills: 1 | Status: SHIPPED | OUTPATIENT
Start: 2021-06-10 | End: 2022-01-10

## 2021-06-23 DIAGNOSIS — Z00.00 ROUTINE GENERAL MEDICAL EXAMINATION AT A HEALTH CARE FACILITY: Primary | ICD-10-CM

## 2021-07-09 ENCOUNTER — LAB REQUISITION (OUTPATIENT)
Dept: LAB | Facility: HOSPITAL | Age: 66
End: 2021-07-09

## 2021-07-09 DIAGNOSIS — Z00.00 ENCOUNTER FOR GENERAL ADULT MEDICAL EXAMINATION WITHOUT ABNORMAL FINDINGS: ICD-10-CM

## 2021-07-09 PROCEDURE — 88304 TISSUE EXAM BY PATHOLOGIST: CPT | Performed by: SPECIALIST

## 2021-07-11 DIAGNOSIS — E78.2 MIXED HYPERLIPIDEMIA: ICD-10-CM

## 2021-07-12 RX ORDER — SIMVASTATIN 40 MG
40 TABLET ORAL DAILY
Qty: 90 TABLET | Refills: 1 | Status: SHIPPED | OUTPATIENT
Start: 2021-07-12 | End: 2022-01-10

## 2021-07-13 LAB
CYTO UR: NORMAL
LAB AP CASE REPORT: NORMAL
LAB AP CLINICAL INFORMATION: NORMAL
PATH REPORT.FINAL DX SPEC: NORMAL
PATH REPORT.GROSS SPEC: NORMAL

## 2021-07-16 ENCOUNTER — LAB (OUTPATIENT)
Dept: LAB | Facility: HOSPITAL | Age: 66
End: 2021-07-16

## 2021-07-16 DIAGNOSIS — Z00.00 ROUTINE GENERAL MEDICAL EXAMINATION AT A HEALTH CARE FACILITY: ICD-10-CM

## 2021-07-16 PROCEDURE — 36415 COLL VENOUS BLD VENIPUNCTURE: CPT

## 2021-07-16 PROCEDURE — 86769 SARS-COV-2 COVID-19 ANTIBODY: CPT

## 2021-07-17 LAB — SARS-COV-2 AB SERPL QL IA: POSITIVE

## 2021-08-29 DIAGNOSIS — I10 ESSENTIAL HYPERTENSION: ICD-10-CM

## 2021-08-30 RX ORDER — ATENOLOL 50 MG/1
50 TABLET ORAL DAILY
Qty: 90 TABLET | Refills: 2 | Status: SHIPPED | OUTPATIENT
Start: 2021-08-30 | End: 2022-05-02 | Stop reason: SDUPTHER

## 2021-09-27 ENCOUNTER — OFFICE VISIT (OUTPATIENT)
Dept: OTOLARYNGOLOGY | Facility: CLINIC | Age: 66
End: 2021-09-27

## 2021-09-27 VITALS — TEMPERATURE: 97.4 F | SYSTOLIC BLOOD PRESSURE: 125 MMHG | HEART RATE: 80 BPM | DIASTOLIC BLOOD PRESSURE: 70 MMHG

## 2021-09-27 DIAGNOSIS — D22.9 BENIGN NEVUS: Primary | ICD-10-CM

## 2021-09-27 PROCEDURE — 99213 OFFICE O/P EST LOW 20 MIN: CPT | Performed by: OTOLARYNGOLOGY

## 2021-09-27 NOTE — PROGRESS NOTES
"PRIMARY CARE PROVIDER: KAE Gacria MD  REFERRING PROVIDER: No ref. provider found    Chief Complaint   Patient presents with   • mole removal     Mole removal to forehead, nose and chin       Subjective   History of Present Illness:  Israel Campos Jr. is a  66 y.o. male who presents for consultation regarding a skin lesion of the forehead, another of the nose, and one of the chin.  Each lesion has been present for most of his life.  They are relatively unchanged.  He denies any drainage, pain, irritation, ulceration.  He once he is removed from a \"cosmetic\" standpoint.    Review of Systems:  Review of Systems   Constitutional: Negative for chills, fatigue, fever and unexpected weight change.   HENT: Negative for facial swelling.    Respiratory: Negative for cough, chest tightness and shortness of breath.    Cardiovascular: Negative for chest pain.   Musculoskeletal: Negative for neck pain.   Skin: Negative for color change.   Neurological: Negative for facial asymmetry.   Hematological: Negative for adenopathy. Does not bruise/bleed easily.       Past History:  Past Medical History:   Diagnosis Date   • Atrial fibrillation (CMS/HCC)    • Cancer (CMS/HCC)     SKIN CA   • GERD (gastroesophageal reflux disease)    • Hard to intubate     NARROW   • Hypertension    • Melanoma in situ of cheek (CMS/HCC)      Past Surgical History:   Procedure Laterality Date   • HEAD/NECK LESION/CYST EXCISION Left 10/29/2019    Procedure: Excision of skin lesion of the left cheek with linear closure;  Surgeon: Giancarlo Lacy MD;  Location: [facility] OR;  Service: ENT   • HERNIA REPAIR     • OTHER SURGICAL HISTORY Right     SCAR TISSUE REMOVED FROM R AXILLA     Family History   Problem Relation Age of Onset   • Cancer Mother    • Stroke Father    • Diabetes Maternal Grandfather      Social History     Tobacco Use   • Smoking status: Never Smoker   • Smokeless tobacco: Never Used   Substance Use Topics   • Alcohol use: Yes     " Alcohol/week: 14.0 standard drinks     Types: 14 Shots of liquor per week     Comment: WIFE REPORTS DISCREETLY PT HAS 2 DRINKS PER NIGHT   • Drug use: No     Allergies:  Shellfish-derived products, Azithromycin, Iodine, and Sudafed  [pseudoephedrine hcl]    Current Outpatient Medications:   •  albuterol sulfate  (90 Base) MCG/ACT inhaler, Inhale 2 puffs Daily As Needed for Shortness of Air., Disp: , Rfl:   •  ALPRAZolam (XANAX) 0.25 MG tablet, TAKE 1 TO 2 TABLETS BY MOUTH ONCE DAILY AS NEEDED FOR ANXIETY, Disp: 180 tablet, Rfl: 1  •  amLODIPine (NORVASC) 5 MG tablet, Take 1 tablet by mouth once daily, Disp: 90 tablet, Rfl: 2  •  aspirin 81 MG tablet, Take 1 tablet by mouth Daily., Disp: , Rfl:   •  atenolol (TENORMIN) 50 MG tablet, Take 1 tablet by mouth Daily., Disp: 90 tablet, Rfl: 2  •  escitalopram (LEXAPRO) 10 MG tablet, Take 1 tablet by mouth Daily., Disp: 90 tablet, Rfl: 2  •  fexofenadine (ALLEGRA) 180 MG tablet, Take 180 mg by mouth Daily., Disp: , Rfl:   •  guaiFENesin (MUCINEX) 600 MG 12 hr tablet, Take 1,200 mg by mouth Daily., Disp: , Rfl:   •  losartan (COZAAR) 100 MG tablet, Take 1 tablet by mouth once daily, Disp: 90 tablet, Rfl: 3  •  methylcellulose, Laxative, (CITRUCEL) 500 MG tablet tablet, Take 2 tablets by mouth Daily., Disp: , Rfl:   •  omeprazole (priLOSEC) 20 MG capsule, Take 1 capsule by mouth Daily., Disp: 90 capsule, Rfl: 2  •  simvastatin (ZOCOR) 40 MG tablet, Take 1 tablet by mouth Daily., Disp: 90 tablet, Rfl: 1    Objective     Vital Signs:  Temp:  [97.4 °F (36.3 °C)] 97.4 °F (36.3 °C)  Heart Rate:  [80] 80  BP: (125)/(70) 125/70    Physical Exam:  Physical Exam  Vitals and nursing note reviewed.   Constitutional:       General: He is not in acute distress.     Appearance: He is well-developed. He is not diaphoretic.   HENT:      Head: Normocephalic and atraumatic.      Right Ear: External ear normal.      Left Ear: External ear normal.      Nose: Nose normal.   Eyes:       General: No scleral icterus.        Right eye: No discharge.         Left eye: No discharge.      Conjunctiva/sclera: Conjunctivae normal.      Pupils: Pupils are equal, round, and reactive to light.   Neck:      Thyroid: No thyromegaly.      Vascular: No JVD.      Trachea: No tracheal deviation.   Pulmonary:      Effort: Pulmonary effort is normal.      Breath sounds: No stridor.   Musculoskeletal:         General: No deformity. Normal range of motion.      Cervical back: Normal range of motion and neck supple.   Lymphadenopathy:      Cervical: No cervical adenopathy.   Skin:     General: Skin is warm and dry.      Coloration: Skin is not pale.      Findings: No erythema or rash.   Neurological:      Mental Status: He is alert and oriented to person, place, and time.      Cranial Nerves: No cranial nerve deficit.      Coordination: Coordination normal.   Psychiatric:         Speech: Speech normal.         Behavior: Behavior normal. Behavior is cooperative.         Thought Content: Thought content normal.         Judgment: Judgment normal.       Please see below for the lesion size, appearance, and planned excision pattern:    Operative plan:                Assessment   1. Benign nevus        Plan   The lesions are not changing, and I do not think they would meet medical necessity for removal.  He is interested in having them removed from a cosmetic standpoint.  I would recommend extending the skin to the pathologist if removed.  This would carry with it no additional charge.  We will send him a cosmetic quote for each area.  I would recommend that he remove one at a time, to see how each heals.  I have removed a melanoma of his cheek, and he is very pleased with result.  The order of preference would be the chin, forehead, nose.  I did discuss the risk of trapdoor deformity on the nasal bilobed flap.  He will think about each, and call to schedule.    Discussion of skin lesion. Discussed risks, benefits,  alternatives, and possible complications of excision of the skin lesion with reconstruction utilizing local tissue rearrangement, full-thickness skin grafting, or local interpolated flaps. Risks include, but are not limited too: bleeding, infection, hematoma, recurrence, need for additional procedures, flap failure, cosmetic deformity. Patient understands risks and would like to proceed with surgery.     My findings and recommendations were discussed and questions were answered.     Giancarlo Lacy MD  09/27/21  11:34 CDT

## 2021-09-27 NOTE — PATIENT INSTRUCTIONS
CONTACT INFORMATION:  The main office phone number is [phone]. For emergencies after hours and on weekends, this number will convert over to our answering service and the on call provider will answer. Please try to keep non emergent phone calls/ questions to office hours 9am-5pm Monday through Friday.     Spoqa  As an alternative, you can sign up and use the Epic MyChart system for more direct and quicker access for non emergent questions/ problems.  [facility] Spoqa allows you to send messages to your doctor, view your test results, renew your prescriptions, schedule appointments, and more. To sign up, go to [employer] and click on the Sign Up Now link in the New User? box. Enter your Spoqa Activation Code exactly as it appears below along with the last four digits of your Social Security Number and your Date of Birth () to complete the sign-up process. If you do not sign up before the expiration date, you must request a new code.    Spoqa Activation Code: Activation code not generated  Current Spoqa Status: Active    If you have questions, you can email [email or web address] or call [phone] to talk to our Spoqa staff. Remember, Spoqa is NOT to be used for urgent needs. For medical emergencies, dial 911.

## 2021-10-25 DIAGNOSIS — Z01.818 PREOP TESTING: Primary | ICD-10-CM

## 2021-11-05 DIAGNOSIS — I10 ESSENTIAL HYPERTENSION: ICD-10-CM

## 2021-11-05 RX ORDER — LOSARTAN POTASSIUM 100 MG/1
100 TABLET ORAL DAILY
Qty: 90 TABLET | Refills: 3 | Status: SHIPPED | OUTPATIENT
Start: 2021-11-05

## 2021-12-17 ENCOUNTER — LAB (OUTPATIENT)
Dept: LAB | Facility: HOSPITAL | Age: 66
End: 2021-12-17

## 2021-12-17 DIAGNOSIS — Z01.818 PREOP TESTING: ICD-10-CM

## 2021-12-17 LAB — SARS-COV-2 ORF1AB RESP QL NAA+PROBE: NOT DETECTED

## 2021-12-17 PROCEDURE — C9803 HOPD COVID-19 SPEC COLLECT: HCPCS

## 2021-12-17 PROCEDURE — U0004 COV-19 TEST NON-CDC HGH THRU: HCPCS

## 2021-12-20 ENCOUNTER — PROCEDURE VISIT (OUTPATIENT)
Dept: OTOLARYNGOLOGY | Facility: CLINIC | Age: 66
End: 2021-12-20

## 2021-12-20 VITALS
DIASTOLIC BLOOD PRESSURE: 79 MMHG | SYSTOLIC BLOOD PRESSURE: 140 MMHG | BODY MASS INDEX: 29.92 KG/M2 | HEART RATE: 82 BPM | WEIGHT: 202 LBS | TEMPERATURE: 98 F | HEIGHT: 69 IN | RESPIRATION RATE: 20 BRPM

## 2021-12-20 DIAGNOSIS — D48.5 NEOPLASM OF UNCERTAIN BEHAVIOR OF SKIN: Primary | ICD-10-CM

## 2021-12-20 PROCEDURE — 88305 TISSUE EXAM BY PATHOLOGIST: CPT | Performed by: OTOLARYNGOLOGY

## 2021-12-20 NOTE — PROGRESS NOTES
PATIENT NAME:  Israel Campos Jr.    DATE:  12/20/21    PREOPERATIVE DIAGNOSIS: Neoplasm of uncertain behavior skin of chin    POSTOPERATIVE DIAGNOSIS: Neoplasm of uncertain behavior skin of chin    PROCEDURE:  1) cosmetic removal of neoplasm of uncertain behavior of chin, 7 mm x 20 mm   2) cosmetic layered closure of chin, 20 mm    SURGEON:  Giancarlo Lacy MD, FACS    FACILITY: [facility] Office Procedure Room    ANESTHESIA:  Local with 1 cc 1% lidocaine and 1:100,000 epinephrine    DICTATED BY:  Giancarlo Lacy MD, FACS    IVF: None    IMPLANTS: None    DRAINS: None    SPECIMENS: Neoplasm uncertain behavior of chin    EBL: 20 cc    COMPLICATIONS: None apparent    INDICATIONS FOR SURGERY: Mr. Campos had a nonchanging lesion present on his chin that he wanted removed for cosmetic improvement.    OPERATIVE FINDINGS:     Lesion: 4 mm x 4 mm  Margins: 1.5 mm  Defect: 7 mm x 20 mm  Depth: Through dermis  Closure Length: 20 mm    OPERATIVE DETAILS:       After patient verification consent material was reviewed, the patient was taken to the procedure room and laid supine on the procedure table.  The skin at the area was cleansed with alcohol, the area marked, the patient was then handed a mirror where we reviewed the intended excision, and verified the consent.  This served as the formal timeout procedure.  The skin was  infiltrated with 1% lidocaine with 1-100,000 epinephrine.    After approximately 15 minutes, the skin was tested for anesthesia, and deemed appropriate.  The lesion was marked with the above listed dimensions, the appropriate margins, as listed above, were drawn around this.  This was then converted to a fusiform-type incision to allow closure and to decrease the standing cutaneous deformities associated with circular to oval-type defects.    The patient was then sterilely prepped and draped.    A 15 blade was used to incise the skin along the prior-marked plan.  The skin was then  undermined in the subcutaneous plane utilizing a #15 blade.  The specimen was oriented with a suture at 12:00 and sent for permanent section analysis.    Utilizing a fresh 15 blade, the skin was undermined in the subcutaneous plane for approximately 5 mm in each direction to facilitate wound closure with reduced tension, and wound eversion.    The skin was closed utilizing deep, buried 5-0 undyed Vicryl suture.  The overlying skin was closed utilizing running, locking 6-0 Prolene.    Antibiotic ointment was placed to the incisions.      DISPOSITION:  The procedures were completed without complication and tolerated well.  The patient was released in the company of himself to return home in satisfactory condition.  A follow-up appointment has been scheduled, routine post-op medications prescribed (if required), and post-op instructions were given to the responsible party.           Giancarlo Lacy MD, FACS  Board Certified Facial Plastic and Reconstructive Surgery  Board Certified Otolaryngology -- Head and Neck Surgery  Electronically signed by Giancarlo Lacy MD, 12/20/21, 4:17 PM CST.

## 2021-12-29 ENCOUNTER — OFFICE VISIT (OUTPATIENT)
Dept: OTOLARYNGOLOGY | Facility: CLINIC | Age: 66
End: 2021-12-29

## 2021-12-29 DIAGNOSIS — Z48.02 VISIT FOR SUTURE REMOVAL: Primary | ICD-10-CM

## 2021-12-29 PROCEDURE — 99024 POSTOP FOLLOW-UP VISIT: CPT | Performed by: OTOLARYNGOLOGY

## 2021-12-29 NOTE — PROGRESS NOTES
Pt here for suture removal and tolerated well.  No s/s of infection noted,  Pt received path results and voiced understanding.  Pt voiced understanding of instructions on scar away..

## 2021-12-30 RX ORDER — ALBUTEROL SULFATE 90 UG/1
2 AEROSOL, METERED RESPIRATORY (INHALATION) DAILY PRN
Qty: 18 G | Refills: 1 | Status: SHIPPED | OUTPATIENT
Start: 2021-12-30

## 2022-01-04 ENCOUNTER — LAB (OUTPATIENT)
Dept: LAB | Facility: HOSPITAL | Age: 67
End: 2022-01-04

## 2022-01-04 DIAGNOSIS — Z00.00 HEALTHCARE MAINTENANCE: ICD-10-CM

## 2022-01-04 DIAGNOSIS — Z12.5 PROSTATE CANCER SCREENING: ICD-10-CM

## 2022-01-04 LAB — PSA SERPL-MCNC: 1.95 NG/ML (ref 0–4)

## 2022-01-04 PROCEDURE — 36415 COLL VENOUS BLD VENIPUNCTURE: CPT

## 2022-01-04 PROCEDURE — G0103 PSA SCREENING: HCPCS

## 2022-01-05 DIAGNOSIS — J06.9 UPPER RESPIRATORY TRACT INFECTION, UNSPECIFIED TYPE: Primary | ICD-10-CM

## 2022-01-10 DIAGNOSIS — F41.9 ANXIETY: ICD-10-CM

## 2022-01-10 DIAGNOSIS — E78.2 MIXED HYPERLIPIDEMIA: ICD-10-CM

## 2022-01-11 RX ORDER — ALPRAZOLAM 0.25 MG/1
TABLET ORAL
Qty: 180 TABLET | Refills: 0 | Status: SHIPPED | OUTPATIENT
Start: 2022-01-11

## 2022-01-11 RX ORDER — SIMVASTATIN 40 MG
40 TABLET ORAL DAILY
Qty: 90 TABLET | Refills: 1 | Status: SHIPPED | OUTPATIENT
Start: 2022-01-11

## 2022-02-16 DIAGNOSIS — L98.9 SKIN LESION: Primary | ICD-10-CM

## 2022-05-02 DIAGNOSIS — I10 ESSENTIAL HYPERTENSION: ICD-10-CM

## 2022-05-03 RX ORDER — ATENOLOL 50 MG/1
50 TABLET ORAL DAILY
Qty: 30 TABLET | Refills: 0 | Status: SHIPPED | OUTPATIENT
Start: 2022-05-03

## 2022-05-04 NOTE — TELEPHONE ENCOUNTER
PATIENT HAS RETURNED CALL, GIVEN MESSAGE AND STATED THAT HE IS ON THE ROAD A LOT AND WILL HAVE TO CALL  BACK AND SCHEDULE AN APPOINTMENT.

## 2022-11-02 DIAGNOSIS — I10 ESSENTIAL HYPERTENSION: ICD-10-CM

## 2022-11-02 RX ORDER — LOSARTAN POTASSIUM 100 MG/1
100 TABLET ORAL DAILY
Qty: 90 TABLET | Refills: 0 | OUTPATIENT
Start: 2022-11-02

## 2022-12-04 ENCOUNTER — HOSPITAL ENCOUNTER (EMERGENCY)
Facility: HOSPITAL | Age: 67
Discharge: HOME | End: 2022-12-04
Attending: FAMILY MEDICINE | Admitting: FAMILY MEDICINE

## 2022-12-04 ENCOUNTER — DOCUMENTATION (OUTPATIENT)
Dept: GASTROENTEROLOGY | Facility: CLINIC | Age: 67
End: 2022-12-04

## 2022-12-04 ENCOUNTER — HOSPITAL ENCOUNTER (EMERGENCY)
Age: 67
Discharge: LEFT BEFORE DISCHARGE | End: 2022-12-04

## 2022-12-04 ENCOUNTER — APPOINTMENT (OUTPATIENT)
Dept: CT IMAGING | Facility: HOSPITAL | Age: 67
End: 2022-12-04

## 2022-12-04 VITALS
HEART RATE: 93 BPM | BODY MASS INDEX: 27.55 KG/M2 | TEMPERATURE: 98.1 F | HEIGHT: 69 IN | DIASTOLIC BLOOD PRESSURE: 98 MMHG | WEIGHT: 186 LBS | RESPIRATION RATE: 20 BRPM | SYSTOLIC BLOOD PRESSURE: 154 MMHG | OXYGEN SATURATION: 99 %

## 2022-12-04 VITALS
HEIGHT: 69 IN | RESPIRATION RATE: 16 BRPM | DIASTOLIC BLOOD PRESSURE: 81 MMHG | OXYGEN SATURATION: 97 % | TEMPERATURE: 98.5 F | HEART RATE: 87 BPM | SYSTOLIC BLOOD PRESSURE: 152 MMHG | WEIGHT: 186 LBS | BODY MASS INDEX: 27.55 KG/M2

## 2022-12-04 DIAGNOSIS — R13.10 DYSPHAGIA, UNSPECIFIED TYPE: Primary | ICD-10-CM

## 2022-12-04 DIAGNOSIS — R09.89 SENSATION OF FOREIGN BODY IN THROAT: ICD-10-CM

## 2022-12-04 PROCEDURE — 99283 EMERGENCY DEPT VISIT LOW MDM: CPT

## 2022-12-04 PROCEDURE — 70490 CT SOFT TISSUE NECK W/O DYE: CPT

## 2022-12-04 RX ORDER — OMEPRAZOLE 10 MG/1
10 CAPSULE, DELAYED RELEASE ORAL DAILY
Status: ON HOLD | COMMUNITY
End: 2022-12-06 | Stop reason: HOSPADM

## 2022-12-04 RX ORDER — AMLODIPINE BESYLATE 5 MG/1
5 TABLET ORAL DAILY
COMMUNITY

## 2022-12-04 RX ORDER — ALPRAZOLAM 0.25 MG/1
0.25 TABLET ORAL NIGHTLY PRN
COMMUNITY

## 2022-12-04 ASSESSMENT — PAIN - FUNCTIONAL ASSESSMENT: PAIN_FUNCTIONAL_ASSESSMENT: 0-10

## 2022-12-04 ASSESSMENT — PAIN SCALES - GENERAL: PAINLEVEL_OUTOF10: 8

## 2022-12-04 NOTE — ED PROVIDER NOTES
Subjective   History of Present Illness  Patient presents emergency room today with complaints of difficulty swallowing.  Patient states that last night he was eating some chicken and he feels like that he is having some chicken stuck in his throat.  Patient states that he has had a history of this before a few years ago.  Patient states that he does have some esophageal problems that his doctors told him about.  Patient states that he has not had any episodes of vomiting.  Patient states that he has no wheezing or shortness of breath.  Patient has no difficulty breathing.  Patient has no headache or dizziness.  Patient's no chest pain.  Patient states that he feels like the piece of chicken is stuck in his throat.  Patient states that he did try to swallow his morning medications this morning but he did have 1 episode of vomiting after that.  Patient states that it is not chicken bone but chicken meat.        Review of Systems   HENT: Positive for trouble swallowing.    Gastrointestinal: Positive for vomiting.   All other systems reviewed and are negative.      Past Medical History:   Diagnosis Date   • Atrial fibrillation (HCC)    • Cancer (HCC)     SKIN CA   • GERD (gastroesophageal reflux disease)    • Hard to intubate     NARROW   • Hypertension    • Melanoma in situ of cheek (HCC)        Allergies   Allergen Reactions   • Shellfish-Derived Products Hives     iodine   • Azithromycin Nausea Only   • Iodine Rash   • Sudafed  [Pseudoephedrine Hcl] Rash       Past Surgical History:   Procedure Laterality Date   • HEAD/NECK LESION/CYST EXCISION Left 10/29/2019    Procedure: Excision of skin lesion of the left cheek with linear closure;  Surgeon: Giancarlo Lacy MD;  Location: [facility];  Service: ENT   • HERNIA REPAIR     • OTHER SURGICAL HISTORY Right     SCAR TISSUE REMOVED FROM R AXILLA   • SKIN LESION EXCISION      exc chin lesion 12/20/21       Family History   Problem Relation Age of Onset   • Cancer Mother     • Stroke Father    • Diabetes Maternal Grandfather        Social History     Socioeconomic History   • Marital status:    Tobacco Use   • Smoking status: Never   • Smokeless tobacco: Never   Substance and Sexual Activity   • Alcohol use: Yes     Alcohol/week: 14.0 standard drinks     Types: 14 Shots of liquor per week     Comment: WIFE REPORTS DISCREETLY PT HAS 2 DRINKS PER NIGHT   • Drug use: No   • Sexual activity: Defer           Objective   Physical Exam  Vitals and nursing note reviewed.   Constitutional:       General: He is not in acute distress.     Appearance: Normal appearance. He is not toxic-appearing or diaphoretic.   HENT:      Head: Normocephalic and atraumatic.      Mouth/Throat:      Mouth: Mucous membranes are moist.   Eyes:      Extraocular Movements: Extraocular movements intact.      Pupils: Pupils are equal, round, and reactive to light.   Cardiovascular:      Rate and Rhythm: Normal rate and regular rhythm.      Heart sounds: Normal heart sounds.   Pulmonary:      Effort: Pulmonary effort is normal.      Breath sounds: Normal breath sounds.   Abdominal:      General: Bowel sounds are normal.      Palpations: Abdomen is soft.      Tenderness: There is no abdominal tenderness.   Musculoskeletal:      Cervical back: Normal range of motion and neck supple.   Skin:     General: Skin is warm and dry.   Neurological:      General: No focal deficit present.      Mental Status: He is alert and oriented to person, place, and time.   Psychiatric:         Mood and Affect: Mood normal.         Behavior: Behavior normal.         Procedures           ED Course                                            CT Soft Tissue Neck Without Contrast   Final Result   1. No acute abnormality, no radiopaque foreign body is identified. Small   amount of fluid within and mild distention of the lumen of the proximal   esophagus, correlate for reflux disease.   2. Chronic sinusitis mostly involving the right  maxillary sinus.           This report was finalized on 12/04/2022 11:07 by Dr. Israel Rodriguez MD.            MDM  Patient's CAT scan shows no foreign body in his esophagus.  Patient will be discharged home in a stable condition.  Patient was advised to follow-up with his GI doctor regarding a possible EGD for his difficulty of swallowing.  Patient will also be advised to follow-up with his primary care provider.  Patient was advised to return to the emergency room with new or worsening symptoms.  Patient verbalized understanding was agreeable to plan as discussed.      Final diagnoses:   Dysphagia, unspecified type   Sensation of foreign body in throat       ED Disposition  ED Disposition     ED Disposition   Discharge    Condition   Stable    Comment   --             [facility] Emergency Department  [facility] [facility] [address] [phone]    As needed, If symptoms worsen    [facility] GASTROENTEROLOGY  [address] [place] [address] [address]  [facility] [address] [phone]             Medication List      No changes were made to your prescriptions during this visit.          Darien Trujillo MD  12/04/22 1127

## 2022-12-05 ENCOUNTER — HOSPITAL ENCOUNTER (OUTPATIENT)
Age: 67
Setting detail: OBSERVATION
LOS: 1 days | Discharge: HOME | End: 2022-12-06
Attending: EMERGENCY MEDICINE | Admitting: INTERNAL MEDICINE
Payer: COMMERCIAL

## 2022-12-05 ENCOUNTER — ANESTHESIA EVENT (OUTPATIENT)
Dept: ENDOSCOPY | Age: 67
End: 2022-12-05
Payer: COMMERCIAL

## 2022-12-05 ENCOUNTER — ANESTHESIA (OUTPATIENT)
Dept: ENDOSCOPY | Age: 67
End: 2022-12-05
Payer: COMMERCIAL

## 2022-12-05 ENCOUNTER — APPOINTMENT (OUTPATIENT)
Dept: CT IMAGING | Age: 67
End: 2022-12-05
Payer: COMMERCIAL

## 2022-12-05 ENCOUNTER — TELEPHONE (OUTPATIENT)
Dept: GASTROENTEROLOGY | Facility: CLINIC | Age: 67
End: 2022-12-05

## 2022-12-05 DIAGNOSIS — T18.128A ESOPHAGEAL OBSTRUCTION DUE TO FOOD IMPACTION: Primary | ICD-10-CM

## 2022-12-05 DIAGNOSIS — K22.2 ESOPHAGEAL OBSTRUCTION DUE TO FOOD IMPACTION: Primary | ICD-10-CM

## 2022-12-05 PROBLEM — K31.7 GASTRIC POLYPS: Status: ACTIVE | Noted: 2022-12-05

## 2022-12-05 PROBLEM — R13.10 DYSPHAGIA: Status: ACTIVE | Noted: 2022-12-05
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
## 2022-12-05 PROBLEM — K31.7 SUBMUCOSAL POLYP OF DUODENUM: Status: ACTIVE | Noted: 2022-12-05

## 2022-12-05 PROBLEM — K21.9 GASTROESOPHAGEAL REFLUX DISEASE: Status: ACTIVE | Noted: 2022-12-05

## 2022-12-05 LAB
ALBUMIN SERPL-MCNC: 5.2 G/DL (ref 3.5–5.2)
ALP BLD-CCNC: 102 U/L (ref 40–130)
ALT SERPL-CCNC: 21 U/L (ref 5–41)
ANION GAP SERPL CALCULATED.3IONS-SCNC: 13 MMOL/L (ref 7–19)
APTT: 28.2 SEC (ref 26–36.2)
AST SERPL-CCNC: 22 U/L (ref 5–40)
BASOPHILS ABSOLUTE: 0.1 K/UL (ref 0–0.2)
BASOPHILS RELATIVE PERCENT: 1 % (ref 0–1)
BILIRUB SERPL-MCNC: 0.7 MG/DL (ref 0.2–1.2)
BUN BLDV-MCNC: 19 MG/DL (ref 8–23)
CALCIUM SERPL-MCNC: 10.1 MG/DL (ref 8.8–10.2)
CHLORIDE BLD-SCNC: 104 MMOL/L (ref 98–111)
CO2: 26 MMOL/L (ref 22–29)
CREAT SERPL-MCNC: 0.8 MG/DL (ref 0.5–1.2)
EKG P AXIS: 62 DEGREES
EKG P-R INTERVAL: 178 MS
EKG Q-T INTERVAL: 396 MS
EKG QRS DURATION: 88 MS
EKG QTC CALCULATION (BAZETT): 434 MS
EKG T AXIS: 55 DEGREES
EOSINOPHILS ABSOLUTE: 0.1 K/UL (ref 0–0.6)
EOSINOPHILS RELATIVE PERCENT: 1 % (ref 0–5)
GFR SERPL CREATININE-BSD FRML MDRD: >60 ML/MIN/{1.73_M2}
GLUCOSE BLD-MCNC: 130 MG/DL (ref 74–109)
HCT VFR BLD CALC: 44 % (ref 42–52)
HEMOGLOBIN: 14.2 G/DL (ref 14–18)
IMMATURE GRANULOCYTES #: 0 K/UL
INR BLD: 1.02 (ref 0.88–1.18)
LYMPHOCYTES ABSOLUTE: 1.4 K/UL (ref 1.1–4.5)
LYMPHOCYTES RELATIVE PERCENT: 16.5 % (ref 20–40)
MCH RBC QN AUTO: 29 PG (ref 27–31)
MCHC RBC AUTO-ENTMCNC: 32.3 G/DL (ref 33–37)
MCV RBC AUTO: 89.8 FL (ref 80–94)
MONOCYTES ABSOLUTE: 0.9 K/UL (ref 0–0.9)
MONOCYTES RELATIVE PERCENT: 10.7 % (ref 0–10)
NEUTROPHILS ABSOLUTE: 5.8 K/UL (ref 1.5–7.5)
NEUTROPHILS RELATIVE PERCENT: 70.6 % (ref 50–65)
PDW BLD-RTO: 13 % (ref 11.5–14.5)
PLATELET # BLD: 238 K/UL (ref 130–400)
PMV BLD AUTO: 10.5 FL (ref 9.4–12.4)
POTASSIUM SERPL-SCNC: 3.8 MMOL/L (ref 3.5–5)
PROTHROMBIN TIME: 13.3 SEC (ref 12–14.6)
RBC # BLD: 4.9 M/UL (ref 4.7–6.1)
SARS-COV-2, NAAT: NOT DETECTED
SODIUM BLD-SCNC: 143 MMOL/L (ref 136–145)
TOTAL PROTEIN: 7.1 G/DL (ref 6.6–8.7)
WBC # BLD: 8.2 K/UL (ref 4.8–10.8)

## 2022-12-05 PROCEDURE — 85025 COMPLETE CBC W/AUTO DIFF WBC: CPT

## 2022-12-05 PROCEDURE — 36415 COLL VENOUS BLD VENIPUNCTURE: CPT

## 2022-12-05 PROCEDURE — 7100000000 HC PACU RECOVERY - FIRST 15 MIN: Performed by: SPECIALIST

## 2022-12-05 PROCEDURE — 2580000003 HC RX 258: Performed by: NURSE ANESTHETIST, CERTIFIED REGISTERED

## 2022-12-05 PROCEDURE — 71250 CT THORAX DX C-: CPT

## 2022-12-05 PROCEDURE — 87635 SARS-COV-2 COVID-19 AMP PRB: CPT

## 2022-12-05 PROCEDURE — 2709999900 HC NON-CHARGEABLE SUPPLY: Performed by: SPECIALIST

## 2022-12-05 PROCEDURE — G0378 HOSPITAL OBSERVATION PER HR: HCPCS

## 2022-12-05 PROCEDURE — 93010 ELECTROCARDIOGRAM REPORT: CPT | Performed by: INTERNAL MEDICINE

## 2022-12-05 PROCEDURE — 85610 PROTHROMBIN TIME: CPT

## 2022-12-05 PROCEDURE — C9113 INJ PANTOPRAZOLE SODIUM, VIA: HCPCS | Performed by: EMERGENCY MEDICINE

## 2022-12-05 PROCEDURE — 93005 ELECTROCARDIOGRAM TRACING: CPT | Performed by: EMERGENCY MEDICINE

## 2022-12-05 PROCEDURE — 3700000000 HC ANESTHESIA ATTENDED CARE: Performed by: SPECIALIST

## 2022-12-05 PROCEDURE — 80053 COMPREHEN METABOLIC PANEL: CPT

## 2022-12-05 PROCEDURE — 99285 EMERGENCY DEPT VISIT HI MDM: CPT

## 2022-12-05 PROCEDURE — 6360000002 HC RX W HCPCS: Performed by: NURSE ANESTHETIST, CERTIFIED REGISTERED

## 2022-12-05 PROCEDURE — 2500000003 HC RX 250 WO HCPCS: Performed by: NURSE ANESTHETIST, CERTIFIED REGISTERED

## 2022-12-05 PROCEDURE — 99204 OFFICE O/P NEW MOD 45 MIN: CPT | Performed by: SPECIALIST

## 2022-12-05 PROCEDURE — 85730 THROMBOPLASTIN TIME PARTIAL: CPT

## 2022-12-05 PROCEDURE — 3700000001 HC ADD 15 MINUTES (ANESTHESIA): Performed by: SPECIALIST

## 2022-12-05 PROCEDURE — 6360000002 HC RX W HCPCS: Performed by: EMERGENCY MEDICINE

## 2022-12-05 PROCEDURE — 96374 THER/PROPH/DIAG INJ IV PUSH: CPT

## 2022-12-05 PROCEDURE — 3609012900 HC EGD FOREIGN BODY REMOVAL: Performed by: SPECIALIST

## 2022-12-05 PROCEDURE — 7100000001 HC PACU RECOVERY - ADDTL 15 MIN: Performed by: SPECIALIST

## 2022-12-05 RX ORDER — ONDANSETRON 4 MG/1
4 TABLET, ORALLY DISINTEGRATING ORAL EVERY 8 HOURS PRN
Status: DISCONTINUED | OUTPATIENT
Start: 2022-12-05 | End: 2022-12-06 | Stop reason: HOSPADM

## 2022-12-05 RX ORDER — METOCLOPRAMIDE HYDROCHLORIDE 5 MG/ML
10 INJECTION INTRAMUSCULAR; INTRAVENOUS
Status: DISCONTINUED | OUTPATIENT
Start: 2022-12-05 | End: 2022-12-06 | Stop reason: HOSPADM

## 2022-12-05 RX ORDER — SUCCINYLCHOLINE CHLORIDE 20 MG/ML
INJECTION INTRAMUSCULAR; INTRAVENOUS PRN
Status: DISCONTINUED | OUTPATIENT
Start: 2022-12-05 | End: 2022-12-05 | Stop reason: SDUPTHER

## 2022-12-05 RX ORDER — LIDOCAINE HYDROCHLORIDE 10 MG/ML
INJECTION, SOLUTION INFILTRATION; PERINEURAL PRN
Status: DISCONTINUED | OUTPATIENT
Start: 2022-12-05 | End: 2022-12-05 | Stop reason: SDUPTHER

## 2022-12-05 RX ORDER — MIDAZOLAM HYDROCHLORIDE 1 MG/ML
INJECTION INTRAMUSCULAR; INTRAVENOUS PRN
Status: DISCONTINUED | OUTPATIENT
Start: 2022-12-05 | End: 2022-12-05 | Stop reason: SDUPTHER

## 2022-12-05 RX ORDER — PROPOFOL 10 MG/ML
INJECTION, EMULSION INTRAVENOUS PRN
Status: DISCONTINUED | OUTPATIENT
Start: 2022-12-05 | End: 2022-12-05 | Stop reason: SDUPTHER

## 2022-12-05 RX ORDER — ESCITALOPRAM OXALATE 10 MG/1
10 TABLET ORAL DAILY
Status: DISCONTINUED | OUTPATIENT
Start: 2022-12-05 | End: 2022-12-06 | Stop reason: HOSPADM

## 2022-12-05 RX ORDER — ATORVASTATIN CALCIUM 20 MG/1
20 TABLET, FILM COATED ORAL DAILY
Status: DISCONTINUED | OUTPATIENT
Start: 2022-12-05 | End: 2022-12-06 | Stop reason: HOSPADM

## 2022-12-05 RX ORDER — ACETAMINOPHEN 325 MG/1
650 TABLET ORAL EVERY 6 HOURS PRN
Status: DISCONTINUED | OUTPATIENT
Start: 2022-12-05 | End: 2022-12-06 | Stop reason: HOSPADM

## 2022-12-05 RX ORDER — SODIUM CHLORIDE 0.9 % (FLUSH) 0.9 %
5-40 SYRINGE (ML) INJECTION PRN
Status: DISCONTINUED | OUTPATIENT
Start: 2022-12-05 | End: 2022-12-06 | Stop reason: HOSPADM

## 2022-12-05 RX ORDER — PANTOPRAZOLE SODIUM 40 MG/10ML
80 INJECTION, POWDER, LYOPHILIZED, FOR SOLUTION INTRAVENOUS ONCE
Status: COMPLETED | OUTPATIENT
Start: 2022-12-05 | End: 2022-12-05

## 2022-12-05 RX ORDER — LOSARTAN POTASSIUM 100 MG/1
100 TABLET ORAL DAILY
Status: DISCONTINUED | OUTPATIENT
Start: 2022-12-05 | End: 2022-12-06 | Stop reason: HOSPADM

## 2022-12-05 RX ORDER — SODIUM CHLORIDE 0.9 % (FLUSH) 0.9 %
10 SYRINGE (ML) INJECTION PRN
Status: DISCONTINUED | OUTPATIENT
Start: 2022-12-05 | End: 2022-12-06 | Stop reason: HOSPADM

## 2022-12-05 RX ORDER — ENOXAPARIN SODIUM 100 MG/ML
40 INJECTION SUBCUTANEOUS EVERY 24 HOURS
Status: DISCONTINUED | OUTPATIENT
Start: 2022-12-05 | End: 2022-12-06 | Stop reason: HOSPADM

## 2022-12-05 RX ORDER — DEXAMETHASONE SODIUM PHOSPHATE 10 MG/ML
INJECTION, SOLUTION INTRAMUSCULAR; INTRAVENOUS PRN
Status: DISCONTINUED | OUTPATIENT
Start: 2022-12-05 | End: 2022-12-05 | Stop reason: SDUPTHER

## 2022-12-05 RX ORDER — SODIUM CHLORIDE 9 MG/ML
INJECTION, SOLUTION INTRAVENOUS PRN
Status: DISCONTINUED | OUTPATIENT
Start: 2022-12-05 | End: 2022-12-06 | Stop reason: HOSPADM

## 2022-12-05 RX ORDER — POTASSIUM CHLORIDE 7.45 MG/ML
10 INJECTION INTRAVENOUS PRN
Status: DISCONTINUED | OUTPATIENT
Start: 2022-12-05 | End: 2022-12-06 | Stop reason: HOSPADM

## 2022-12-05 RX ORDER — PANTOPRAZOLE SODIUM 40 MG/1
40 TABLET, DELAYED RELEASE ORAL
Status: DISCONTINUED | OUTPATIENT
Start: 2022-12-05 | End: 2022-12-06 | Stop reason: HOSPADM

## 2022-12-05 RX ORDER — HYDROMORPHONE HYDROCHLORIDE 1 MG/ML
0.25 INJECTION, SOLUTION INTRAMUSCULAR; INTRAVENOUS; SUBCUTANEOUS EVERY 5 MIN PRN
Status: DISCONTINUED | OUTPATIENT
Start: 2022-12-05 | End: 2022-12-06 | Stop reason: HOSPADM

## 2022-12-05 RX ORDER — ACETAMINOPHEN 650 MG/1
650 SUPPOSITORY RECTAL EVERY 6 HOURS PRN
Status: DISCONTINUED | OUTPATIENT
Start: 2022-12-05 | End: 2022-12-06 | Stop reason: HOSPADM

## 2022-12-05 RX ORDER — ONDANSETRON 2 MG/ML
4 INJECTION INTRAMUSCULAR; INTRAVENOUS EVERY 6 HOURS PRN
Status: DISCONTINUED | OUTPATIENT
Start: 2022-12-05 | End: 2022-12-06 | Stop reason: HOSPADM

## 2022-12-05 RX ORDER — DIPHENHYDRAMINE HYDROCHLORIDE 50 MG/ML
12.5 INJECTION INTRAMUSCULAR; INTRAVENOUS
Status: DISCONTINUED | OUTPATIENT
Start: 2022-12-05 | End: 2022-12-06 | Stop reason: HOSPADM

## 2022-12-05 RX ORDER — PANTOPRAZOLE SODIUM 40 MG/1
40 TABLET, DELAYED RELEASE ORAL
Status: DISCONTINUED | OUTPATIENT
Start: 2022-12-06 | End: 2022-12-05

## 2022-12-05 RX ORDER — POTASSIUM CHLORIDE 20 MEQ/1
40 TABLET, EXTENDED RELEASE ORAL PRN
Status: DISCONTINUED | OUTPATIENT
Start: 2022-12-05 | End: 2022-12-06 | Stop reason: HOSPADM

## 2022-12-05 RX ORDER — ATENOLOL 50 MG/1
50 TABLET ORAL DAILY
Status: DISCONTINUED | OUTPATIENT
Start: 2022-12-05 | End: 2022-12-06 | Stop reason: HOSPADM

## 2022-12-05 RX ORDER — SODIUM CHLORIDE 0.9 % (FLUSH) 0.9 %
10 SYRINGE (ML) INJECTION EVERY 12 HOURS SCHEDULED
Status: DISCONTINUED | OUTPATIENT
Start: 2022-12-05 | End: 2022-12-06 | Stop reason: HOSPADM

## 2022-12-05 RX ORDER — SODIUM CHLORIDE, SODIUM LACTATE, POTASSIUM CHLORIDE, CALCIUM CHLORIDE 600; 310; 30; 20 MG/100ML; MG/100ML; MG/100ML; MG/100ML
INJECTION, SOLUTION INTRAVENOUS CONTINUOUS PRN
Status: DISCONTINUED | OUTPATIENT
Start: 2022-12-05 | End: 2022-12-05 | Stop reason: SDUPTHER

## 2022-12-05 RX ORDER — ALPRAZOLAM 0.25 MG/1
0.25 TABLET ORAL NIGHTLY PRN
Status: DISCONTINUED | OUTPATIENT
Start: 2022-12-05 | End: 2022-12-06 | Stop reason: HOSPADM

## 2022-12-05 RX ORDER — ONDANSETRON 2 MG/ML
INJECTION INTRAMUSCULAR; INTRAVENOUS PRN
Status: DISCONTINUED | OUTPATIENT
Start: 2022-12-05 | End: 2022-12-05 | Stop reason: SDUPTHER

## 2022-12-05 RX ORDER — SODIUM CHLORIDE 0.9 % (FLUSH) 0.9 %
5-40 SYRINGE (ML) INJECTION EVERY 12 HOURS SCHEDULED
Status: DISCONTINUED | OUTPATIENT
Start: 2022-12-05 | End: 2022-12-06 | Stop reason: HOSPADM

## 2022-12-05 RX ORDER — ROCURONIUM BROMIDE 10 MG/ML
INJECTION, SOLUTION INTRAVENOUS PRN
Status: DISCONTINUED | OUTPATIENT
Start: 2022-12-05 | End: 2022-12-05 | Stop reason: SDUPTHER

## 2022-12-05 RX ORDER — HYDROMORPHONE HYDROCHLORIDE 1 MG/ML
0.5 INJECTION, SOLUTION INTRAMUSCULAR; INTRAVENOUS; SUBCUTANEOUS EVERY 5 MIN PRN
Status: DISCONTINUED | OUTPATIENT
Start: 2022-12-05 | End: 2022-12-06 | Stop reason: HOSPADM

## 2022-12-05 RX ORDER — AMLODIPINE BESYLATE 5 MG/1
5 TABLET ORAL DAILY
Status: DISCONTINUED | OUTPATIENT
Start: 2022-12-05 | End: 2022-12-06 | Stop reason: HOSPADM

## 2022-12-05 RX ORDER — FENTANYL CITRATE 50 UG/ML
INJECTION, SOLUTION INTRAMUSCULAR; INTRAVENOUS PRN
Status: DISCONTINUED | OUTPATIENT
Start: 2022-12-05 | End: 2022-12-05 | Stop reason: SDUPTHER

## 2022-12-05 RX ADMIN — SODIUM CHLORIDE, SODIUM LACTATE, POTASSIUM CHLORIDE, AND CALCIUM CHLORIDE: 600; 310; 30; 20 INJECTION, SOLUTION INTRAVENOUS at 14:05

## 2022-12-05 RX ADMIN — ONDANSETRON 4 MG: 2 INJECTION INTRAMUSCULAR; INTRAVENOUS at 14:08

## 2022-12-05 RX ADMIN — PANTOPRAZOLE SODIUM 80 MG: 40 INJECTION, POWDER, FOR SOLUTION INTRAVENOUS at 09:28

## 2022-12-05 RX ADMIN — SODIUM CHLORIDE, PRESERVATIVE FREE 10 ML: 5 INJECTION INTRAVENOUS at 21:52

## 2022-12-05 RX ADMIN — FENTANYL CITRATE 50 MCG: 50 INJECTION INTRAMUSCULAR; INTRAVENOUS at 14:11

## 2022-12-05 RX ADMIN — MIDAZOLAM 2 MG: 1 INJECTION INTRAMUSCULAR; INTRAVENOUS at 14:09

## 2022-12-05 RX ADMIN — SUCCINYLCHOLINE CHLORIDE 120 MG: 20 INJECTION, SOLUTION INTRAMUSCULAR; INTRAVENOUS at 14:16

## 2022-12-05 RX ADMIN — DEXAMETHASONE SODIUM PHOSPHATE 10 MG: 10 INJECTION, SOLUTION INTRAMUSCULAR; INTRAVENOUS at 14:23

## 2022-12-05 RX ADMIN — PROPOFOL 150 MG: 10 INJECTION, EMULSION INTRAVENOUS at 14:14

## 2022-12-05 RX ADMIN — ROCURONIUM BROMIDE 5 MG: 10 INJECTION, SOLUTION INTRAVENOUS at 14:15

## 2022-12-05 RX ADMIN — LIDOCAINE HYDROCHLORIDE 40 MG: 10 INJECTION, SOLUTION INFILTRATION; PERINEURAL at 14:14

## 2022-12-05 ASSESSMENT — ENCOUNTER SYMPTOMS
TROUBLE SWALLOWING: 0
TROUBLE SWALLOWING: 1
GASTROINTESTINAL NEGATIVE: 1
NAUSEA: 0
VOICE CHANGE: 0
COUGH: 0
SORE THROAT: 1
ABDOMINAL DISTENTION: 0
VOMITING: 1
EYES NEGATIVE: 1
ABDOMINAL PAIN: 0
ALLERGIC/IMMUNOLOGIC NEGATIVE: 1
SHORTNESS OF BREATH: 0
DIARRHEA: 0

## 2022-12-05 ASSESSMENT — PAIN - FUNCTIONAL ASSESSMENT: PAIN_FUNCTIONAL_ASSESSMENT: NONE - DENIES PAIN

## 2022-12-05 NOTE — ANESTHESIA PRE PROCEDURE
Department of Anesthesiology  Preprocedure Note       Name:  Michael Nielsen   Age:  79 y.o.  :  1955                                          MRN:  [id number]         Date:  2022      Surgeon: Jerica Molina):  Hill Gregg MD    Procedure: Procedure(s):  EGD FOREIGN BODY REMOVAL    Medications prior to admission:   Prior to Admission medications    Medication Sig Start Date End Date Taking? Authorizing Provider   ALPRAZolam (XANAX) 0.25 MG tablet Take 0.25 mg by mouth nightly as needed for Sleep. Historical Provider, MD   amLODIPine (NORVASC) 5 MG tablet Take 5 mg by mouth daily    Historical Provider, MD   omeprazole (PRILOSEC) 10 MG delayed release capsule Take 10 mg by mouth daily    Historical Provider, MD   escitalopram (LEXAPRO) 10 MG tablet Take 1 tablet by mouth daily 19   VINCENT Alford   simvastatin (ZOCOR) 20 MG tablet TAKE 1 TABLET BY MOUTH ONCE DAILY  Patient taking differently: 40 mg 4/15/19   VINCENT Alford   atenolol (TENORMIN) 50 MG tablet TAKE 1 TABLET BY MOUTH ONCE DAILY 3/26/19   VINCENT Alford   albuterol sulfate  (90 Base) MCG/ACT inhaler Inhale 2 puffs into the lungs 4 times daily as needed for Wheezing 3/18/19   VINCENT Alford   losartan (COZAAR) 100 MG tablet Take 1 tablet by mouth daily 3/15/19   VINCENT Alford       Current medications:    No current facility-administered medications for this encounter. Allergies:     Allergies   Allergen Reactions    Iodine     Shellfish Allergy     Sudafed [Pseudoephedrine]      palpitations    Zithromax [Azithromycin] Nausea Only       Problem List:    Patient Active Problem List   Diagnosis Code    Mixed hyperlipidemia E78.2    Essential hypertension I10    Paroxysmal atrial fibrillation (HCC) I48.0    Major depressive disorder in remission ([facility] [address] F32.5    Vasovagal syncope/neurocardiogenic syncope R55    Hypogonadism male E29.1    Prediabetes R73.03    Food impaction of esophagus U10.098G    Dysphagia R13.10       Past Medical History:        Diagnosis Date    Atrial fibrillation ([facility]     Essential hypertension 07/13/2018    Hypogonadism male 07/13/2018    Major depressive disorder in remission ([facility] 07/13/2018    Mixed hyperlipidemia 07/13/2018    Paroxysmal atrial fibrillation ([facility] 07/13/2018    Prediabetes 07/13/2018    Vasovagal syncope 07/13/2018       Past Surgical History:        Procedure Laterality Date    SKIN CANCER EXCISION      melanoma removal       Social History:    Social History     Tobacco Use    Smoking status: Never    Smokeless tobacco: Never   Substance Use Topics    Alcohol use: Yes     Alcohol/week: 7.0 standard drinks     Types: 7 Shots of liquor per week                                Counseling given: Not Answered      Vital Signs (Current):   Vitals:    12/05/22 0819 12/05/22 0931 12/05/22 1031 12/05/22 1311   BP:  (!) 166/87 (!) 144/85 (!) 158/90   Pulse:  87 88 85   Resp:  15 18 18   Temp: 97.7 °F (36.5 °C)   97.2 °F (36.2 °C)   TempSrc: Oral      SpO2:  96% 99% 97%   Weight:       Height:                                                  BP Readings from Last 3 Encounters:   12/05/22 (!) 158/90   04/16/19 (!) 140/100   03/15/19 (!) 148/91       NPO Status:                                                                                 BMI:   Wt Readings from Last 3 Encounters:   12/05/22 186 lb (84.4 kg)   04/16/19 197 lb 9.6 oz (89.6 kg)   03/15/19 197 lb 12.8 oz (89.7 kg)     Body mass index is 27.47 kg/m².     CBC:   Lab Results   Component Value Date/Time    WBC 8.2 12/05/2022 08:51 AM    RBC 4.90 12/05/2022 08:51 AM    HGB 14.2 12/05/2022 08:51 AM    HCT 44.0 12/05/2022 08:51 AM    MCV 89.8 12/05/2022 08:51 AM    RDW 13.0 12/05/2022 08:51 AM     12/05/2022 08:51 AM       CMP:   Lab Results   Component Value Date/Time     12/05/2022 08:51 AM    K 3.8 12/05/2022 08:51 AM     12/05/2022 08:51 AM    CO2 26 12/05/2022 08:51 AM BUN 19 12/05/2022 08:51 AM    CREATININE 0.8 12/05/2022 08:51 AM    LABGLOM >60 12/05/2022 08:51 AM    GLUCOSE 130 12/05/2022 08:51 AM    PROT 7.1 12/05/2022 08:51 AM    CALCIUM 10.1 12/05/2022 08:51 AM    BILITOT 0.7 12/05/2022 08:51 AM    ALKPHOS 102 12/05/2022 08:51 AM    AST 22 12/05/2022 08:51 AM    ALT 21 12/05/2022 08:51 AM       POC Tests: No results for input(s): POCGLU, POCNA, POCK, POCCL, POCBUN, POCHEMO, POCHCT in the last 72 hours. Coags:   Lab Results   Component Value Date/Time    PROTIME 13.3 12/05/2022 11:35 AM    INR 1.02 12/05/2022 11:35 AM    APTT 28.2 12/05/2022 11:35 AM       HCG (If Applicable): No results found for: PREGTESTUR, PREGSERUM, HCG, HCGQUANT     ABGs: No results found for: PHART, PO2ART, AKR3SZI, DZK0SCR, BEART, F0BKKZPN     Type & Screen (If Applicable):  No results found for: LABABO, LABRH    Drug/Infectious Status (If Applicable):  No results found for: HIV, HEPCAB    COVID-19 Screening (If Applicable):   Lab Results   Component Value Date/Time    COVID19 Not Detected 12/05/2022 09:41 AM           Anesthesia Evaluation  Patient summary reviewed  Airway: Mallampati: II  TM distance: >3 FB   Neck ROM: full  Mouth opening: < 3 FB   Dental: normal exam         Pulmonary:normal exam    (+) sleep apnea (?):                             Cardiovascular:  Exercise tolerance: good (>4 METS),   (+) hypertension:, dysrhythmias: atrial fibrillation,          Beta Blocker:  Dose within 24 Hrs         Neuro/Psych:   (+) depression/anxiety             GI/Hepatic/Renal:            ROS comment: Food impaction in esophagus. Endo/Other: Negative Endo/Other ROS                    Abdominal:             Vascular: negative vascular ROS. Other Findings:           Anesthesia Plan      general and TIVA     ASA 2       Induction: intravenous and rapid sequence. Anesthetic plan and risks discussed with patient.                         JOE Guevara - CRNA   12/5/2022

## 2022-12-05 NOTE — PROGRESS NOTES
4 Eyes Skin Assessment    Aimee Lozada is being assessed upon: Admission    I agree that Osito Oliva RN, along with Jorge Lafleur (either 2 RN's or 1 LPN and 1 RN) have performed a thorough Head to Toe Skin Assessment on the patient. ALL assessment sites listed below have been assessed. Areas assessed by both nurses:     [x]   Head, Face, and Ears   [x]   Shoulders, Back, and Chest  [x]   Arms, Elbows, and Hands   [x]   Coccyx, Sacrum, and Ischium  [x]   Legs, Feet, and Heels    Does the Patient have Skin Breakdown?  No    Carlos Prevention initiated: No  Wound Care Orders initiated: No    WOC nurse consulted for Pressure Injury (Stage 3,4, Unstageable, DTI, NWPT, and Complex wounds) and New or Established Ostomies: NA        Primary Nurse eSignature: Verena Phillips RN on 12/5/2022 at 4:40 PM      Co-Signer eSignature: Electronically signed by Benji Best RN on 12/5/22 at 4:42 PM CST

## 2022-12-05 NOTE — TELEPHONE ENCOUNTER
He called yesterday apx 1 pm through answering service.  He had been to ER w c/o sudden onset of dysphasia.  CT of neck was negative.  I asked if he was able to tolerate liquids/his saliva and he initially told me no.  After further questioning he indicated that it was painful for him to swallow.   I sent carafate to [facility].  They did not have liquid and no longer compound (initially I requested lidocaine Benadryl compound).       He was not happy that I did not have anything else to offer and that ER didn't offer procedure     Dr SOLOMON was not called by ER.  I tried to reassure him CT was neg and even though it is painful to swallow he was able to hold conversation with me without difficulty and could tolerate his saliva.   He continued to express his dissatisfaction that I did not have anything to offer.       He said dr kelly did a scope on him last year but I did not see any scopes by Dr Kelly when I looked his chart up.  Radha is going to request his records from old system be uploaded to epic      I will send message to Emily to see if she can get Israel Campos Jr. on

## 2022-12-05 NOTE — CONSULTS
Pt Name: Kingsley Quinn  MRN: [id number]  [id number]  YOB: 1955  Admit Date: 12/5/2022  8:07 AM  Date of evaluation: 12/5/2022  Primary Care Physician: No primary care provider on file. 08/08       Requesting Provider: Hospitalist service. GI Consult      Assessment:    Dysphagia. He ate chicken about 36 hours ago. He  feels it is still stuck in the neck area. Apparently had a recent visit to [facility]    Has allergic diathesis. Impression/assessment:    Patient may have food impaction. He may have EOE since you are allergic diathesis. He may have reflux esophagitis, peptic stricture. It is possible he could have Schatzki's ring. He could have Zenker's diverticulum. Plan:    PPIs.    EGD. Discussed the need, benefits, risks, alternatives limitation EGD and food disimpaction. Patient understands and he would like to proceed with EGD and esophageal dilation if needed. He understands that if esophageal dilation is needed there is increased risk of perforation. Obtain previous EGD and colonoscopy report from Dr. Odalis Galeana from 7 years ago    Obtain results of the CT scan of the neck done at [facility] yesterday. .              Reason for consult: Dysphagia/food impaction for at least 36 hours. GI History:    The patient is a 79 y.o. male. ER physician called me that patient is complaining of dysphagia. There is question of food impaction. Apparently patient was recently seen at [facility] ER and was discharged. Patient is still complaining of something stuck in the esophagus and the chest area. ER nurse note from 12/4/2022 patient's vital signs were stable. Airway was intact. Patient was not drooling or any acute distress. Prior notes under care everywhere tab patient was seen in [facility] emergency room for dysphagia. No records of EGD or barium swallow available at this time. There is clinical documentation from gastroenterology by [facility].   No details available. Was not due to/health until 36 hours ago when he ate chicken. He still feels something stuck in his neck area. He has a history of GERD. He has been on Prilosec. Last EGD 7 years ago by Dr. Vitaliy Schuster. Report not available. Denies family history of esophageal cancer. Has personal history of allergic diathesis. He had a colonoscopy 7 years ago for: Cancer screening. Colonoscopy done by Dr. Vitaliy Schuster, GI at [facility].  Report not available. Patient denies family history of GI malignancy. Past medical history: A. fib, hypertension, hypogonadism, major depression, hyperlipidemia, paroxysmal atrial fibrillation, prediabetes, vasovagal syncope in 2018. Surgical history: Skin cancer excision, melanoma removal.    Family history: Mother cancer. Father stroke. Brother depression. Current prescriptions: 8 listed: Xanax, Norvasc, Prilosec 10 mg daily, Lexapro, simvastatin, atenolol, albuterol, losartan. 12/5/2022: Electrolytes and kidney function normal.  Glucose 130. Calcium 10.1.    12/5/2022: LFTs normal.    12/5/2022 WBC 8.2, hemoglobin 14.2, MCV 90, platelet count 198,026. COVID not detected. 12/5/2022: CT chest without contrast: Results not available at the time of dictation. Last EGD: 7 years ago by Dr. Vitaliy Schuster. Report not available. Last Colonoscopy: 12/27/2013 colonoscopy: No details or findings available    Nonhospital problems: Hyperlipidemia, hyper tension, paroxysmal atrial fibrillation, major depression, vasovagal syncope, hypogonadism, prediabetes.           Diagnosis Date    Atrial fibrillation [facility])     Essential hypertension 07/13/2018    Hypogonadism male 07/13/2018    Major depressive disorder in remission ([facility] [address] 07/13/2018    Mixed hyperlipidemia 07/13/2018    Paroxysmal atrial fibrillation ([facility] [address] 07/13/2018    Prediabetes 07/13/2018    Vasovagal syncope 07/13/2018       Past Surgical History:            Procedure Laterality Date    SKIN CANCER EXCISION      melanoma removal         Allergies:  Iodine, Shellfish allergy, Sudafed [pseudoephedrine], and Zithromax [azithromycin]      Prior to Admission medications    Medication Sig Start Date End Date Taking? Authorizing Provider   ALPRAZolam (XANAX) 0.25 MG tablet Take 0.25 mg by mouth nightly as needed for Sleep. Historical Provider, MD   amLODIPine (NORVASC) 5 MG tablet Take 5 mg by mouth daily    Historical Provider, MD   omeprazole (PRILOSEC) 10 MG delayed release capsule Take 10 mg by mouth daily    Historical Provider, MD   escitalopram (LEXAPRO) 10 MG tablet Take 1 tablet by mouth daily 4/16/19   VINCENT Alba   simvastatin (ZOCOR) 20 MG tablet TAKE 1 TABLET BY MOUTH ONCE DAILY  Patient taking differently: 40 mg 4/15/19   VINCENT Alba   atenolol (TENORMIN) 50 MG tablet TAKE 1 TABLET BY MOUTH ONCE DAILY 3/26/19   VINCENT Alba   albuterol sulfate  (90 Base) MCG/ACT inhaler Inhale 2 puffs into the lungs 4 times daily as needed for Wheezing 3/18/19   VINCENT Alba   losartan (COZAAR) 100 MG tablet Take 1 tablet by mouth daily 3/15/19   VINCENT Alba                PRN Meds:            Social History     Socioeconomic History    Marital status:      Spouse name: Sindhu Chua    Number of children: 2    Years of education: 16    Highest education level: Not on file   Occupational History    Occupation: [employer]   Tobacco Use    Smoking status: Never    Smokeless tobacco: Never   Vaping Use    Vaping Use: Never used   Substance and Sexual Activity    Alcohol use:  Yes     Alcohol/week: 7.0 standard drinks     Types: 7 Shots of liquor per week    Drug use: No    Sexual activity: Yes     Partners: Female     Comment: wife   Other Topics Concern    Not on file   Social History Narrative    Not on file     Social Determinants of Health     Financial Resource Strain: Not on file   Food Insecurity: Not on file   Transportation Needs: Not on file Physical Activity: Not on file   Stress: Not on file   Social Connections: Not on file   Intimate Partner Violence: Not on file   Housing Stability: Not on file         Family History   Problem Relation Age of Onset    Cancer Mother         nasal cavity    Stroke Father         ischemic    Other Father 79        mitral regurgitation h/o MV repair at age 79    Depression Brother        ROS:     A pertinent review of all body system was done. Review of systems is as per HPI. Rest of the review of systems either negative, not pertinent or patient was unable to provide review of systems. Physical Exam:      Vitals:    12/05/22 0818 12/05/22 0819 12/05/22 0931   BP: (!) 183/86  (!) 166/87   Pulse: 93  87   Resp: 18  15   Temp:  97.7 °F (36.5 °C)    TempSrc:  Oral    SpO2: 99%  96%   Weight: 186 lb (84.4 kg)     Height: 5' 9\" (1.753 m)           General appearance: Alert and oriented. Constantly spitting saliva in a bag. Head: normocephalic. No jaundice. Neck: Supple. No swelling. Abdomen: Obese. Therefore exam limited. No gross organomegaly. No gross masses. No tense ascites. Abdomen is nontender. No point or rebound tenderness. No peritoneal signs. Extremities: No leg edema. Skin: No jaundice. Neurologic: Alert and oriented. Labs:       Recent Labs     12/05/22  0851   WBC 8.2   RBC 4.90   HGB 14.2   HCT 44.0   MCV 89.8   MCH 29.0   MCHC 32.3*          @LABRCNT(NA:3,K:3,ANIONGAP:3,CL:3,CO2:3,BUN:3,CREATININE:3,GLUCOSE:3,    GFR:3,CALCIUM:3)@    No results for input(s): MG, PHOS in the last 72 hours.     Recent Labs     12/05/22  0851   AST 22   ALT 21   BILITOT 0.7   ALKPHOS 102       HgBA1c:  No components found for: HGBA1C    FLP:    Lab Results   Component Value Date/Time    TRIG 128 03/14/2019 07:19 AM    HDL 42 03/14/2019 07:19 AM    LDLCALC 138 03/14/2019 07:19 AM       TSH:  No results found for: TSH    Troponin T: No results for input(s): TROPONINI in the last 72 hours. INR: No results for input(s): INR in the last 72 hours. No results for input(s): LIPASE, AMYLASE in the last 72 hours. Radiology:    Reviewed available results of the pertinent imaging studies. EGD consent / shared decision making :    Consent obtained from: Patient. He would like to proceed. Witness (es): ER nurse. Obtained consent in layperson's language, previously and immediately prior to.procedure. Explained the need, benefits, risks, alternatives and limitations of the procedure. Need and benefits: Make diagnosis, provide therapy and plan treatment including feeding into the stomach. Risks: Bleeding, perforation of esophagus, stomach, duodenum, splenic injury / laceration, liver injury / laceration, aspiration pneumonia, respiratory arrest, cardiac arrhythmia, cardiac arrest, death. infection and peritoneum, infection in blood. Tumor seeding: In case of oral, laryngeal, esophageal malignancy and gastric malignancy: The tumor may seed into esophagus, stomach, gastrostomy site in the stomach and gastrostomy site on the skin    Alternatives: Barium swallow, upper GI, SBFT, CT chest, CT abdomen, no testing, wait and watch, colonoscopy, obtain second opinion. Missed diagnosis and limitations: Missed diagnosis, including that of varices, gastric ulcer, duodenal ulcer, cancer. Also inability to complete the procedure due to various reasons-technical including equipment failure and non-technical.    Complications of sedation/analgesia: Including aspiration pneumonia, respiratory arrest, cardiac arrhythmia, cardiac arrest, hypotension, hypertension, paralysis. Opportunity was given for questions. The questions that were asked, were answered. The healthcare client verbalized understanding.     Valente Rico MD  12/5/2022  10:44 AM        Disclaimer speech recognition software       (Please note that portions of this note were completed with a voice recognition program. Efforts were made to edit the dictations but occasionally words are mis-transcribed.)        Disclaimer speech recognition software       (Please note that portions of this note were completed with a voice recognition program. Efforts were made to edit the dictations but occasionally words are mis-transcribed.)      Thank you for the consultation and allowing me to participate in this patient's care alongside with you!     Aimee Velez MD    12/5/2022

## 2022-12-05 NOTE — ED PROVIDER NOTES
[facility] EMERGENCY DEPT  eMERGENCY dEPARTMENT eNCOUnter      Pt Name: Kisha Mo  MRN: [id number]  Kathygfanastacia 1955  Date of evaluation: 12/5/2022  Provider: Hamzah Arias MD    CHIEF COMPLAINT       Chief Complaint   Patient presents with    Foreign Body     Food bolus         HISTORY OF PRESENT ILLNESS   (Location/Symptom, Timing/Onset,Context/Setting, Quality, Duration, Modifying Factors, Severity)  Note limiting factors. Kisha Mo is a 79 y.o. male who presents to the emergency department for evaluation regarding possible esophageal foreign body. Patient states the symptoms began Vignesh morning at around 1:30 AM when he awoke from sleep with a fairly sharp pain. Patient states that since that time he has had difficulty swallowing any liquid. States that he felt like something is stuck in his upper esophagus. He does have a prior history of previous esophageal food impaction about 7 years ago and underwent EGD at that time. Patient states that he has not been able to take any of his usual medications due to the inability to swallow. Contained on Prilosec therapy and was told during his last EGD that he had a \"narrow esophagus. \"  He has never underwent esophageal dilation. HPI    NursingNotes were reviewed. REVIEW OF SYSTEMS    (2-9 systems for level 4, 10 or more for level 5)     Review of Systems   Constitutional:  Negative for chills. HENT:  Positive for trouble swallowing. Negative for congestion and voice change. Respiratory:  Negative for cough and shortness of breath. Cardiovascular:  Negative for chest pain and palpitations. Gastrointestinal:  Positive for vomiting. Negative for abdominal distention, abdominal pain, diarrhea and nausea. Neurological:  Negative for syncope. All other systems reviewed and are negative.          PAST MEDICALHISTORY     Past Medical History:   Diagnosis Date    Atrial fibrillation [facility])     Essential hypertension 07/13/2018    Hypogonadism male 07/13/2018    Major depressive disorder in remission ([facility] [address] 07/13/2018    Mixed hyperlipidemia 07/13/2018    Paroxysmal atrial fibrillation ([facility] 07/13/2018    Prediabetes 07/13/2018    Vasovagal syncope 07/13/2018         SURGICAL HISTORY       Past Surgical History:   Procedure Laterality Date    SKIN CANCER EXCISION      melanoma removal         CURRENT MEDICATIONS     Previous Medications    ALBUTEROL SULFATE  (90 BASE) MCG/ACT INHALER    Inhale 2 puffs into the lungs 4 times daily as needed for Wheezing    ALPRAZOLAM (XANAX) 0.25 MG TABLET    Take 0.25 mg by mouth nightly as needed for Sleep. AMLODIPINE (NORVASC) 5 MG TABLET    Take 5 mg by mouth daily    ATENOLOL (TENORMIN) 50 MG TABLET    TAKE 1 TABLET BY MOUTH ONCE DAILY    ESCITALOPRAM (LEXAPRO) 10 MG TABLET    Take 1 tablet by mouth daily    LOSARTAN (COZAAR) 100 MG TABLET    Take 1 tablet by mouth daily    OMEPRAZOLE (PRILOSEC) 10 MG DELAYED RELEASE CAPSULE    Take 10 mg by mouth daily    SIMVASTATIN (ZOCOR) 20 MG TABLET    TAKE 1 TABLET BY MOUTH ONCE DAILY       ALLERGIES     Iodine, Shellfish allergy, Sudafed [pseudoephedrine], and Zithromax [azithromycin]    FAMILY HISTORY       Family History   Problem Relation Age of Onset    Cancer Mother         nasal cavity    Stroke Father         ischemic    Other Father 79        mitral regurgitation h/o MV repair at age 79    Depression Brother           SOCIAL HISTORY       Social History     Socioeconomic History    Marital status:      Spouse name: Allie Ferreira    Number of children: 2    Years of education: 16    Highest education level: None   Occupational History    Occupation: [employer]   Tobacco Use    Smoking status: Never    Smokeless tobacco: Never   Vaping Use    Vaping Use: Never used   Substance and Sexual Activity    Alcohol use:  Yes     Alcohol/week: 7.0 standard drinks     Types: 7 Shots of liquor per week    Drug use: No    Sexual activity: Yes     Partners: Female     Comment: wife       SCREENINGS    Salena Coma Scale  Eye Opening: Spontaneous  Best Verbal Response: Oriented  Best Motor Response: Obeys commands  [place] Coma Scale Score: 15        PHYSICAL EXAM    (up to 7 for level 4, 8 or more for level 5)     ED Triage Vitals   BP Temp Temp Source Heart Rate Resp SpO2 Height Weight   12/05/22 0818 12/05/22 0819 12/05/22 0819 12/05/22 0818 12/05/22 0818 12/05/22 0818 12/05/22 0818 12/05/22 0818   (!) 183/86 97.7 °F (36.5 °C) Oral 93 18 99 % 5' 9\" (1.753 m) 186 lb (84.4 kg)       Physical Exam  Vitals and nursing note reviewed. HENT:      Mouth/Throat:      Mouth: Mucous membranes are moist. Mucous membranes are not dry. Eyes:      General: No scleral icterus. Pupils: Pupils are equal, round, and reactive to light. Neck:      Trachea: No tracheal deviation. Cardiovascular:      Rate and Rhythm: Normal rate and regular rhythm. Pulses: Normal pulses. Heart sounds: Normal heart sounds. No murmur heard. Pulmonary:      Effort: Pulmonary effort is normal. No respiratory distress. Breath sounds: Normal breath sounds. No stridor. Abdominal:      General: There is no distension. Palpations: Abdomen is soft. Tenderness: There is no abdominal tenderness. There is no guarding. Musculoskeletal:      Right lower leg: No edema. Left lower leg: No edema. Skin:     Capillary Refill: Capillary refill takes less than 2 seconds. Coloration: Skin is not pale. Findings: No rash. Neurological:      General: No focal deficit present. Mental Status: He is alert and oriented to person, place, and time. Psychiatric:         Behavior: Behavior is cooperative. DIAGNOSTIC RESULTS     EKG: All EKG's areinterpreted by the Emergency Department Physician who either signs or Co-signs this chart in the absence of a cardiologist.    0930: Normal sinus rhythm at a rate of 82, no evidence of acute ST elevation is identified.   QTc: 473    RADIOLOGY:  Non-plain film images such as CT, Ultrasound and MRI are read by the radiologist. Plain radiographic images are visualized and preliminarily interpreted bythe emergency physician with the below findings:      CT CHEST WO CONTRAST   Final Result   Impression:   Distended fluid-filled esophagus. There is no radiopaque lesion identified in the esophageal lumen. Cholelithiasis. Recommendation:   Follow up as clinically indicated. All CT scans at this facility utilize dose modulation, iterative reconstruction, and/or weight based dosing when appropriate to reduce radiation dose to as low as reasonably achievable. Electronically Signed by Kassie Campos MD at 05-Dec-2022 12:00:50 PM                       LABS:  Labs Reviewed   CBC WITH AUTO DIFFERENTIAL - Abnormal; Notable for the following components:       Result Value    MCHC 32.3 (*)     Neutrophils % 70.6 (*)     Lymphocytes % 16.5 (*)     Monocytes % 10.7 (*)     All other components within normal limits   COMPREHENSIVE METABOLIC PANEL - Abnormal; Notable for the following components:    Glucose 130 (*)     All other components within normal limits   COVID-19, RAPID   APTT   PROTIME-INR       All other labs were within normal range or not returned as of this dictation. EMERGENCY DEPARTMENT COURSE and DIFFERENTIAL DIAGNOSIS/MDM:   Vitals:    Vitals:    12/05/22 0818 12/05/22 0819 12/05/22 0931 12/05/22 1031   BP: (!) 183/86  (!) 166/87 (!) 144/85   Pulse: 93  87 88   Resp: 18  15 18   Temp:  97.7 °F (36.5 °C)     TempSrc:  Oral     SpO2: 99%  96% 99%   Weight: 186 lb (84.4 kg)      Height: 5' 9\" (1.753 m)          MDM    Patient is unable to swallow water here in the ED. Suspect he likely has an esophageal food impaction of greater than 24 hours duration. We will plan his consult GI    CONSULTS:  Case was discussed with Dr. Vandana Dawson regarding GI consultation.   Advised CT scan of the chest for further evaluation with plans for IV PPI and admission to the hospitalist service with GI consultation. Case was discussed with Dr. Duke Mendoza regarding admission to the hospitalist service. PROCEDURES:  Unless otherwise noted below, none     Procedures    FINAL IMPRESSION      1.  Esophageal obstruction due to food impaction          DISPOSITION/PLAN   DISPOSITION Decision To Admit 12/05/2022 11:27:13 AM      (Please note that portions of this note were completed with a voice recognition program.  Efforts were made to edit thedictations but occasionally words are mis-transcribed.)    Angelo Herbert MD (electronically signed)  Attending Emergency Physician          Angelo Herbert MD  12/05/22 [id number]

## 2022-12-05 NOTE — ANESTHESIA POSTPROCEDURE EVALUATION
Department of Anesthesiology  Postprocedure Note    Patient: Samantha Osorio  MRN: [id number]  YOB: 1955  Date of evaluation: 12/5/2022      Procedure Summary     Date: 12/05/22 Room / Location: [address]    Anesthesia Start: 1405 Anesthesia Stop:     Procedure: EGD FOREIGN BODY REMOVAL Diagnosis:       Food impaction of esophagus, initial encounter      (Food impaction)    Surgeons: Kathi Diaz MD Responsible Provider: JOE Sam CRNA    Anesthesia Type: general, TIVA ASA Status: 2          Anesthesia Type: No value filed.     Tabby Phase I: Tabby Score: 8    Tabby Phase II:        Anesthesia Post Evaluation    Patient location during evaluation: PACU  Patient participation: complete - patient participated  Level of consciousness: sleepy but conscious  Pain score: 2  Airway patency: patent  Nausea & Vomiting: no nausea and no vomiting  Complications: no  Cardiovascular status: hemodynamically stable and blood pressure returned to baseline  Respiratory status: acceptable and nasal cannula  Hydration status: stable

## 2022-12-05 NOTE — TELEPHONE ENCOUNTER
Checked in [employer] and last endoscopy was in 04/2010. Last colonoscopy by Dr. Briones was in 12/2013. Will also send to Connie so she is aware of pt.

## 2022-12-05 NOTE — PROGRESS NOTES
CONSENT SIGNED. SENT WITH PT IN SOFT CHART ALONG WITH MEDICAL RECORDS REQUESTED BY DR. Mauri Fothergill.

## 2022-12-05 NOTE — PROCEDURES
Zenobia Tejeda is a 79 y.o. male patient. 1. Esophageal obstruction due to food impaction      Past Medical History:   Diagnosis Date    Atrial fibrillation ([facility]     Essential hypertension 2018    Hypogonadism male 2018    Major depressive disorder in remission ([facility] 2018    Mixed hyperlipidemia 2018    Paroxysmal atrial fibrillation ([facility] 2018    Prediabetes 2018    Vasovagal syncope 2018     Blood pressure (!) 158/90, pulse 85, temperature 97.2 °F (36.2 °C), resp. rate 18, height 5' 9\" (1.753 m), weight 186 lb (84.4 kg), SpO2 97 %. Procedures          EGD Procedure Note    Patient: Zenobia Tejeda : 1955    Med Rec#: [id number] Acc#: [id number]     Primary Care Provider No primary care provider on file. Referring Provider: Hospitalist.    Endoscopist: Peter Mclaughlin. Yousif Hardin MD    Service Date: 2022    Procedure Date:  2022    Procedure:  EGD and food disimpaction    Preoperative diagnosis:     Food impaction. Postoperative diagnosis:    A piece of meat was stuck in the lower esophagus/GE junction. This was gently pushed down into the stomach. With the meat was impacted, there were erosions, ulceration and blackish mucosa from prolonged impaction of food. Examination this area was limited because there is severe edema in that area. Cannot rule out malignancy. Multiple small polyps in the stomach, most likely fundic gland polyps. Not biopsied because of acuteness of patient's condition. These can be removed at a later time. 1 cm block submucosal lesion in the third part duodenum. This could be submucosal lipoma. It did not look yellowish in color. Other etiologies need to be ruled out. Plan:    Protonix 40 mg p.o. twice daily. Clear liquids by mouth.   EGD and upper EUS in 4 to 6 weeks to better examine the GE junction, take biopsies from esophagus, remove gastric polyps and to evaluate the submucosal lesion in the third part duodenum. No meats. Findings:    Esophagus: A piece of meat was impacted at GE junction/lower esophagus. After pushing a piece of meat nodule in the stomach area examined. Findings as above. Stomach: Multiple small polyps most likely fundic gland polyps. Duodenum: 1 cm submucosal lesion in the third part of duodenum. It need to be examined. EUS in 4 to 6 weeks. Exam Extent: Third part duodenum. Estimated blood loss: None. Immediate complications:  None. Anesthesia / Sedation:  General anesthesia was administered by anesthesia who monitored the patient during the procedure. Patient was intubated with endotracheal tube intubation during the procedure. Procedure:     Consent was obtained prior to procedure. We discussed the procedure itself, and discussed the risks of EGD (including-- but not limited to-- pain, discomfort, bleeding potentially requiring second endoscopic procedure and/or blood transfusion, organ perforation requiring operative repair, damage to adjacent organs, infection, aspiration, cardiopulmonary/allergic reaction). Additionally, we discussed options other than EGD. The patient / health care client expressed understanding. The questions that were asked, were answered. See separate documentation about informed consent in the chart. A time out was performed. The patient was assessed again immediately before the procedure, the patient was placed in the left lateral position. After reviewing the patient's chart and obtaining informed consent, the patient was placed in the left lateral decubitus position. A forward-viewing lubricated endoscope was inserted through the mouth into the oropharynx. Under direct visualization, the upper esophagus was intubated. The scope was advanced to the extent mentioned. Scope was slowly withdrawn into the stomach with careful inspection of the mucosal surfaces.  The scope was retroflexed for inspection of the gastric body, incisura, gastric fundus and cardia. Findings and maneuvers are listed. The scope was then straightened out. The scope was then gradually withdrawn while examining the stomach, GE junction, esophagus and throat. Secretions were suctioned to prevent aspiration. The scope was then completely withdrawn from the patient. The patient tolerated the procedure well. There were no immediate complications.        Denis Arnold MD    12/5/2022    2:08 PM        Disclaimer speech recognition software       (Please note that portions of this note were completed with a voice recognition program. Efforts were made to edit the dictations but occasionally words are mis-transcribed.)    Denis Arnold MD  12/5/2022

## 2022-12-05 NOTE — H&P
[facility] - History & Physical      PCP: No primary care provider on file. Date of Admission: 12/5/2022    Date of Service: 12/5/2022    Chief Complaint:  Foreign body    History Of Present Illness:   Kisha Mo is a 79 y.o. male who presents to the emergency department for evaluation regarding possible esophageal foreign body. Patient states the symptoms began Sunday morning at around 1:30 AM when he awoke from sleep with a fairly sharp pain. Patient states that since that time he has had difficulty swallowing any liquid. States that he felt like something is stuck in his upper esophagus. He does have a prior history of previous esophageal food impaction about 7 years ago and underwent EGD at that time. Patient states that he has not been able to take any of his usual medications due to the inability to swallow. Contained on Prilosec therapy and was told during his last EGD that he had a \"narrow esophagus. \"  He has never underwent esophageal dilation. GI consult- Patient had piece of meat lodged in the lower esophagus/GE junction. meat was impacted, there were erosions, ulceration and blackish mucosa from prolonged impaction of food. Examination this area was limited because there is severe edema in that area. Cannot rule out malignancy. Protonix 40 mg p.o. twice daily. Clear liquids by mouth. EGD and upper EUS in 4 to 6 weeks.       Past Medical History:        Diagnosis Date    Atrial fibrillation [facility])     Essential hypertension 07/13/2018    Hypogonadism male 07/13/2018    Major depressive disorder in remission ([facility] 07/13/2018    Mixed hyperlipidemia 07/13/2018    Paroxysmal atrial fibrillation ([facility] 07/13/2018    Prediabetes 07/13/2018    Vasovagal syncope 07/13/2018       Past Surgical History:        Procedure Laterality Date    SKIN CANCER EXCISION      melanoma removal       Home Medications:  Prior to Admission medications    Medication Sig Start Date End Date Taking? Authorizing Provider   ALPRAZolam (XANAX) 0.25 MG tablet Take 0.25 mg by mouth nightly as needed for Sleep. Historical Provider, MD   amLODIPine (NORVASC) 5 MG tablet Take 5 mg by mouth daily    Historical Provider, MD   omeprazole (PRILOSEC) 10 MG delayed release capsule Take 10 mg by mouth daily    Historical Provider, MD   escitalopram (LEXAPRO) 10 MG tablet Take 1 tablet by mouth daily 4/16/19   VINCENT Alvarado   simvastatin (ZOCOR) 20 MG tablet TAKE 1 TABLET BY MOUTH ONCE DAILY  Patient taking differently: 40 mg 4/15/19   VINCENT Alvarado   atenolol (TENORMIN) 50 MG tablet TAKE 1 TABLET BY MOUTH ONCE DAILY 3/26/19   VINCENT Alvarado   albuterol sulfate  (90 Base) MCG/ACT inhaler Inhale 2 puffs into the lungs 4 times daily as needed for Wheezing 3/18/19   VINCENT Alvarado   losartan (COZAAR) 100 MG tablet Take 1 tablet by mouth daily 3/15/19   VINCENT Alvarado       Allergies:    Iodine, Shellfish allergy, Sudafed [pseudoephedrine], and Zithromax [azithromycin]    Social History:    The patient currently lives home  Tobacco:   reports that he has never smoked. He has never used smokeless tobacco.  Alcohol:   reports current alcohol use of about 7.0 standard drinks per week. Illicit Drugs: denies    Family History:      Problem Relation Age of Onset    Cancer Mother         nasal cavity    Stroke Father         ischemic    Other Father 79        mitral regurgitation h/o MV repair at age 79    Depression Brother        Review of Systems:   Review of Systems   Constitutional:  Negative for activity change, appetite change, fatigue and fever. HENT:  Positive for sore throat. Negative for trouble swallowing. Throat sore,but improved no pain noted. Eyes: Negative. Cardiovascular:  Negative for chest pain, palpitations and leg swelling. Gastrointestinal: Negative. Endocrine: Negative. Genitourinary: Negative. Musculoskeletal: Negative.     Skin: Negative. Allergic/Immunologic: Negative. Neurological: Negative. Hematological: Negative. Psychiatric/Behavioral:  Negative for agitation and confusion. The patient is not nervous/anxious. 14 point review of systems is negative except as specifically addressed above. Physical Examination:  /84   Pulse 84   Temp 98.2 °F (36.8 °C)   Resp 14   Ht 5' 9\" (1.753 m)   Wt 186 lb (84.4 kg)   SpO2 91%   BMI 27.47 kg/m²   Physical Exam  Vitals and nursing note reviewed. Constitutional:       Appearance: Normal appearance. HENT:      Head: Normocephalic. Nose: Nose normal.      Mouth/Throat:      Mouth: Mucous membranes are moist.   Eyes:      Pupils: Pupils are equal, round, and reactive to light. Cardiovascular:      Rate and Rhythm: Normal rate and regular rhythm. Pulses: Normal pulses. Heart sounds: Normal heart sounds. Pulmonary:      Effort: Pulmonary effort is normal.      Breath sounds: Normal breath sounds. Abdominal:      General: There is no distension. Palpations: Abdomen is soft. Tenderness: There is no abdominal tenderness. Musculoskeletal:         General: Normal range of motion. Cervical back: Normal range of motion. Skin:     General: Skin is warm and dry. Capillary Refill: Capillary refill takes less than 2 seconds. Neurological:      Mental Status: He is alert and oriented to person, place, and time.    Psychiatric:         Mood and Affect: Mood normal.         Behavior: Behavior normal.        Diagnostic Data:  CBC:  Recent Labs     12/05/22  0851   WBC 8.2   HGB 14.2   HCT 44.0        BMP:  Recent Labs     12/05/22  0851      K 3.8      CO2 26   BUN 19   CREATININE 0.8   CALCIUM 10.1     Recent Labs     12/05/22  0851   AST 22   ALT 21   BILITOT 0.7   ALKPHOS 102     Coag Panel:   Recent Labs     12/05/22  1135   INR 1.02   PROTIME 13.3   APTT 28.2     Cardiac Enzymes: No results for input(s): CKTOTAL, TROPONINI in the last 72 hours. ABGs:No results found for: PHART, PO2ART, MJM4OZX  Urinalysis:No results found for: NITRU, WBCUA, BACTERIA, RBCUA, BLOODU, SPECGRAV, GLUCOSEU  A1C: No results for input(s): LABA1C in the last 72 hours. ABG:No results for input(s): PHART, ZIN0UVE, PO2ART, NXG6DBG, BEART, HGBAE, X3ZSCISE, CARBOXHGBART in the last 72 hours. CT CHEST WO CONTRAST    Result Date: 12/5/2022  NO PRIOR REPORT AVAILABLE Exam: CT OF THE CHEST WITHOUT CONTRAST Clinical data: Esophageal food impaction versus foreign body. Technique: Axial CT images through the lungs were acquired without contrast and imaged using soft tissue and lung algorithms. Reformatted/MPR images were performed. Radiation Dose: CTDIvol =21.37 mGy, DLP =884 mGy x cm. Limitations: Lack of intravenous contrast limits evaluation of the soft tissues and vascularity. Prior studies: No prior studies submitted. Findings: The thyroid is symmetric. The trachea is midline without endobronchial lesion, focal consolidation or pleural effusion recognized. The pulmonary artery is normal in size . The aorta is normal in size without evidence of aneurysm or dissection. There are atherosclerotic changes of the aorta. The esophagus is fluid-filled and distended. The heart size is normal without pericardial effusion. No significant mediastinal or hilar adenopathy is identified. The visualized ribs and thoracic vertebral bodies are intact. The visualized portions of the upper abdomen demonstrate cholelithiasis. Impression: Distended fluid-filled esophagus. There is no radiopaque lesion identified in the esophageal lumen. Cholelithiasis. Recommendation: Follow up as clinically indicated. All CT scans at this facility utilize dose modulation, iterative reconstruction, and/or weight based dosing when appropriate to reduce radiation dose to as low as reasonably achievable.  Electronically Signed by Kassie Campos MD at 05-Dec-2022 12:00:50 PM EGD    Result Date: 12/5/2022  No dictation       Assessment/Plan:  Principal Problem:    Food impaction of esophagus  Active Problems:    Dysphagia    Gastroesophageal reflux disease    Gastric polyps    Submucosal polyp of duodenum    Esophageal obstruction due to food impaction  Resolved Problems:    * No resolved hospital problems.  *     Dysphagia    Gastroesophageal reflux disease    Gastric polyps    Submucosal polyp of duodenum    Esophageal obstruction due to food impaction   -GI Consult   -EGD- A piece of meat was stuck in the lower esophagus/GE junction.     -Clear liquids   -Follow-up- EGD and upper EUS in 4 to 6 weeks to better examine the GE junction, take biopsies from esophagus, remove gastric polyps and to evaluate the submucosal lesion in the third part duodenum.   -Monitor issues swallowing   -Vital per unit policy   -I&O        Signed:  JOE Tong - CNP, 12/5/2022 3:50 PM

## 2022-12-05 NOTE — PROGRESS NOTES
12/20/2022: 2:07 PM:    Patient here for EGD. Discussed the need, benefits, risks, alternatives and limitations of EGD. Patient understands and would like to proceed with EGD, food disimpaction, tissue removal, esophageal dilation if needed. He does not any further questions. EGD consent / shared decision making :    Consent obtained from: Patient. He would like to proceed. Witness (es): Anesthesia, endoscopy nurse endoscopy technician. Obtained consent in layperson's language, previously and immediately prior to.procedure. Explained the need, benefits, risks, alternatives and limitations of the procedure. Need and benefits: Make diagnosis, provide therapy and plan treatment including feeding into the stomach. Risks: Bleeding, perforation of esophagus, stomach, duodenum, splenic injury / laceration, liver injury / laceration, aspiration pneumonia, respiratory arrest, cardiac arrhythmia, cardiac arrest, death. infection and peritoneum, infection in blood. Tumor seeding: In case of oral, laryngeal, esophageal malignancy and gastric malignancy: The tumor may seed into esophagus, stomach, gastrostomy site in the stomach and gastrostomy site on the skin    Alternatives: Barium swallow, upper GI, SBFT, CT chest, CT abdomen, no testing, wait and watch, colonoscopy, obtain second opinion. Missed diagnosis and limitations: Missed diagnosis, including that of varices, gastric ulcer, duodenal ulcer, cancer. Also inability to complete the procedure due to various reasons-technical including equipment failure and non-technical.    Complications of sedation/analgesia: Including aspiration pneumonia, respiratory arrest, cardiac arrhythmia, cardiac arrest, hypotension, hypertension, paralysis. Opportunity was given for questions. The questions that were asked, were answered. The healthcare client verbalized understanding.     Kiesha Quijano MD  12/5/2022  2:07 PM        Disclaimer speech recognition software       (Please note that portions of this note were completed with a voice recognition program. Efforts were made to edit the dictations but occasionally words are mis-transcribed.)

## 2022-12-06 ENCOUNTER — TELEPHONE (OUTPATIENT)
Dept: GASTROENTEROLOGY | Age: 67
End: 2022-12-06

## 2022-12-06 VITALS
RESPIRATION RATE: 18 BRPM | SYSTOLIC BLOOD PRESSURE: 165 MMHG | HEART RATE: 90 BPM | OXYGEN SATURATION: 95 % | BODY MASS INDEX: 27.55 KG/M2 | HEIGHT: 69 IN | WEIGHT: 186 LBS | DIASTOLIC BLOOD PRESSURE: 85 MMHG | TEMPERATURE: 98.2 F

## 2022-12-06 LAB
ANION GAP SERPL CALCULATED.3IONS-SCNC: 14 MMOL/L (ref 7–19)
BASOPHILS ABSOLUTE: 0 K/UL (ref 0–0.2)
BASOPHILS RELATIVE PERCENT: 0.2 % (ref 0–1)
BUN BLDV-MCNC: 16 MG/DL (ref 8–23)
CALCIUM SERPL-MCNC: 10 MG/DL (ref 8.8–10.2)
CHLORIDE BLD-SCNC: 101 MMOL/L (ref 98–111)
CO2: 24 MMOL/L (ref 22–29)
CREAT SERPL-MCNC: 0.8 MG/DL (ref 0.5–1.2)
EOSINOPHILS ABSOLUTE: 0 K/UL (ref 0–0.6)
EOSINOPHILS RELATIVE PERCENT: 0.1 % (ref 0–5)
GFR SERPL CREATININE-BSD FRML MDRD: >60 ML/MIN/{1.73_M2}
GLUCOSE BLD-MCNC: 116 MG/DL (ref 74–109)
HCT VFR BLD CALC: 42 % (ref 42–52)
HEMOGLOBIN: 13.6 G/DL (ref 14–18)
IMMATURE GRANULOCYTES #: 0.1 K/UL
LYMPHOCYTES ABSOLUTE: 1.7 K/UL (ref 1.1–4.5)
LYMPHOCYTES RELATIVE PERCENT: 12.3 % (ref 20–40)
MCH RBC QN AUTO: 29.2 PG (ref 27–31)
MCHC RBC AUTO-ENTMCNC: 32.4 G/DL (ref 33–37)
MCV RBC AUTO: 90.1 FL (ref 80–94)
MONOCYTES ABSOLUTE: 1.4 K/UL (ref 0–0.9)
MONOCYTES RELATIVE PERCENT: 10.1 % (ref 0–10)
NEUTROPHILS ABSOLUTE: 10.9 K/UL (ref 1.5–7.5)
NEUTROPHILS RELATIVE PERCENT: 76.8 % (ref 50–65)
PDW BLD-RTO: 12.7 % (ref 11.5–14.5)
PLATELET # BLD: 231 K/UL (ref 130–400)
PMV BLD AUTO: 11.4 FL (ref 9.4–12.4)
POTASSIUM REFLEX MAGNESIUM: 4.3 MMOL/L (ref 3.5–5)
RBC # BLD: 4.66 M/UL (ref 4.7–6.1)
SODIUM BLD-SCNC: 139 MMOL/L (ref 136–145)
WBC # BLD: 14.2 K/UL (ref 4.8–10.8)

## 2022-12-06 PROCEDURE — G0378 HOSPITAL OBSERVATION PER HR: HCPCS

## 2022-12-06 PROCEDURE — 94760 N-INVAS EAR/PLS OXIMETRY 1: CPT

## 2022-12-06 PROCEDURE — 80048 BASIC METABOLIC PNL TOTAL CA: CPT

## 2022-12-06 PROCEDURE — 85025 COMPLETE CBC W/AUTO DIFF WBC: CPT

## 2022-12-06 PROCEDURE — 2580000003 HC RX 258: Performed by: INTERNAL MEDICINE

## 2022-12-06 PROCEDURE — 6370000000 HC RX 637 (ALT 250 FOR IP)

## 2022-12-06 PROCEDURE — 6370000000 HC RX 637 (ALT 250 FOR IP): Performed by: SPECIALIST

## 2022-12-06 PROCEDURE — 36415 COLL VENOUS BLD VENIPUNCTURE: CPT

## 2022-12-06 RX ORDER — PANTOPRAZOLE SODIUM 40 MG/1
40 TABLET, DELAYED RELEASE ORAL
Qty: 30 TABLET | Refills: 3 | Status: SHIPPED | OUTPATIENT
Start: 2022-12-06

## 2022-12-06 RX ADMIN — ESCITALOPRAM OXALATE 10 MG: 10 TABLET ORAL at 08:02

## 2022-12-06 RX ADMIN — ATENOLOL 50 MG: 50 TABLET ORAL at 08:02

## 2022-12-06 RX ADMIN — LOSARTAN POTASSIUM 100 MG: 100 TABLET, FILM COATED ORAL at 08:02

## 2022-12-06 RX ADMIN — SODIUM CHLORIDE, PRESERVATIVE FREE 10 ML: 5 INJECTION INTRAVENOUS at 08:04

## 2022-12-06 RX ADMIN — AMLODIPINE BESYLATE 5 MG: 5 TABLET ORAL at 08:02

## 2022-12-06 RX ADMIN — PANTOPRAZOLE SODIUM 40 MG: 40 TABLET, DELAYED RELEASE ORAL at 06:00

## 2022-12-06 RX ADMIN — ATORVASTATIN CALCIUM 20 MG: 20 TABLET, FILM COATED ORAL at 08:02

## 2022-12-06 NOTE — TELEPHONE ENCOUNTER
[facility] called to schedule a NP HFU in 2-4 weeks. Patient needs an egd and cln scheduled as well per the hospital. Please return patient's call with an appt.     Thank you

## 2022-12-06 NOTE — DISCHARGE INSTRUCTIONS
schedule follow-up with your PCP and with Dr. Latricia Wilson or with Dr. Deshawn Pino or with Dr. Tristan Longo in 2-4 weeks and schedule EGD, upper EUS and screening colonoscopy in the outpatient basis

## 2022-12-06 NOTE — DISCHARGE SUMMARY
Zenobia Tejeda  :  1955  MRN:  [id number]    Admit date:  2022  Discharge date:  2022    Discharging Physician:  Dr Severo Savage Directive: Full Code    Consults: Franchesca Check     Primary Care Physician:  No primary care provider on file. Discharge Diagnoses:  Principal Problem:    Food impaction of esophagus  Active Problems:    Dysphagia    Gastroesophageal reflux disease    Gastric polyps    Submucosal polyp of duodenum    Esophageal obstruction due to food impaction  Resolved Problems:    * No resolved hospital problems. *      Portions of this note have been copied forward, however, changed to reflect the most current clinical status of this patient. Hospital Course:   Zenobia Tejeda is a 79 y.o. male who presents to the emergency department for evaluation regarding possible esophageal foreign body. Patient states the symptoms began  morning at around 1:30 AM when he awoke from sleep with a fairly sharp pain. Patient states that since that time he has had difficulty swallowing any liquid. States that he felt like something is stuck in his upper esophagus. He does have a prior history of previous esophageal food impaction about 7 years ago and underwent EGD at that time. Patient states that he has not been able to take any of his usual medications due to the inability to swallow. Contained on Prilosec therapy and was told during his last EGD that he had a \"narrow esophagus. \"  He has never underwent esophageal dilation. GI was consulted,  Patient had piece of meat lodged in the lower esophagus/GE junction, meat was impacted, there were erosions, ulceration and blackish mucosa from prolonged impaction of food. Examination this area was limited because there is severe edema in that area. Cannot rule out malignancy. Protonix 40 mg p.o. twice daily. Clear liquids by mouth.   EGD and upper EUS in 4 to 6 weeks, follow up with PCP or GI for appointment. Patient stable for discharge at this time. Will follow-up with PCP in the next 7 days. Significant Diagnostic Studies:   CT CHEST WO CONTRAST    Result Date: 12/5/2022  NO PRIOR REPORT AVAILABLE Exam: CT OF THE CHEST WITHOUT CONTRAST Clinical data: Esophageal food impaction versus foreign body. Technique: Axial CT images through the lungs were acquired without contrast and imaged using soft tissue and lung algorithms. Reformatted/MPR images were performed. Radiation Dose: CTDIvol =21.37 mGy, DLP =884 mGy x cm. Limitations: Lack of intravenous contrast limits evaluation of the soft tissues and vascularity. Prior studies: No prior studies submitted. Findings: The thyroid is symmetric. The trachea is midline without endobronchial lesion, focal consolidation or pleural effusion recognized. The pulmonary artery is normal in size . The aorta is normal in size without evidence of aneurysm or dissection. There are atherosclerotic changes of the aorta. The esophagus is fluid-filled and distended. The heart size is normal without pericardial effusion. No significant mediastinal or hilar adenopathy is identified. The visualized ribs and thoracic vertebral bodies are intact. The visualized portions of the upper abdomen demonstrate cholelithiasis. Impression: Distended fluid-filled esophagus. There is no radiopaque lesion identified in the esophageal lumen. Cholelithiasis. Recommendation: Follow up as clinically indicated. All CT scans at this facility utilize dose modulation, iterative reconstruction, and/or weight based dosing when appropriate to reduce radiation dose to as low as reasonably achievable.  Electronically Signed by Chencho Chao MD at 05-Dec-2022 12:00:50 PM             EGD    Result Date: 12/5/2022  No dictation       Pertinent Labs:   CBC:   Recent Labs     12/05/22  0851 12/06/22  0800   WBC 8.2 14.2*   HGB 14.2 13.6*    231     BMP:    Recent Labs     12/05/22  0851 PROVENTIL;VENTOLIN;PROAIR  Inhale 2 puffs into the lungs 4 times daily as needed for Wheezing     ALPRAZolam 0.25 MG tablet  Commonly known as: XANAX     amLODIPine 5 MG tablet  Commonly known as: NORVASC     atenolol 50 MG tablet  Commonly known as: TENORMIN  TAKE 1 TABLET BY MOUTH ONCE DAILY     escitalopram 10 MG tablet  Commonly known as: LEXAPRO  Take 1 tablet by mouth daily     losartan 100 MG tablet  Commonly known as: COZAAR  Take 1 tablet by mouth daily            STOP taking these medications      omeprazole 10 MG delayed release capsule  Commonly known as: PRILOSEC               Where to Get Your Medications        These medications were sent to [address], [address] [place] [address], [address] [place] [address]      Phone: [phone]   pantoprazole 40 MG tablet         Discharge Instructions: Follow up with No primary care provider on file. in 7 days. Take medications as directed. Resume activity as tolerated. Diet: ADULT DIET; Easy to Chew     Disposition: Patient is Stable  and will be discharged to Home. Time spent on discharge  35  minutes spent in assessing patient, reviewing medications, discussion with nursing, confirming safe discharge plan and preparation of discharge summary.     Signed:  JOE Nelson - DAVID, 12/6/2022 12:30 PM

## 2022-12-06 NOTE — PROGRESS NOTES
GI  - PROGRESS NOTE    Subjective:   Admit Date: 12/5/2022  PCP: No primary care provider on file. Patient being seen for: 12/5/2022: GI consult for food impaction. EGD showed piece of meat was stuck in the lower esophagus/GE junction. This was gently pushed down into the stomach. There were erosions, ulceration and blackish mucosa from prolonged food impaction at the site. There are multiple small polyps in the stomach most likely fundic gland polyps. 1 cm submucosal lesion in the third part duodenum. Protonix 40 mg p.o. twice daily was recommended. Patient was placed on clear liquids. EGD and upper endoscopic ultrasound in 4-6 weeks to better examine the GE junction area and take biopsies from esophagus, remove gastric polyps and evaluate submucosal lesion in the third part duodenum. 24hr events/Interval History: Nurse at bedside. The patient complains of a little sore throat. He denies chest pain, abdominal pain. He had EGD and colonoscopy by LISSETTE Carey at [facility] several years ago. He is overdue for colonoscopy. Patient denies family history of cancer of esophagus, stomach or colon.      Medications:    Scheduled Meds:   sodium chloride flush  10 mL IntraVENous 2 times per day    enoxaparin  40 mg SubCUTAneous Q24H    sodium chloride flush  5-40 mL IntraVENous 2 times per day    pantoprazole  40 mg Oral BID AC    escitalopram  10 mg Oral Daily    atorvastatin  20 mg Oral Daily    amLODIPine  5 mg Oral Daily    atenolol  50 mg Oral Daily    losartan  100 mg Oral Daily       Continuous Infusions:   sodium chloride      sodium chloride         PRN Meds:.sodium chloride flush, sodium chloride, potassium chloride **OR** potassium alternative oral replacement **OR** potassium chloride, ondansetron **OR** ondansetron, magnesium hydroxide, acetaminophen **OR** acetaminophen, sodium chloride flush, sodium chloride, HYDROmorphone, HYDROmorphone, metoclopramide, diphenhydrAMINE, ALPRAZolam      Labs:     Recent Labs     12/05/22  0851 12/06/22  0800   WBC 8.2 14.2*   RBC 4.90 4.66*   HGB 14.2 13.6*   HCT 44.0 42.0   MCV 89.8 90.1   MCH 29.0 29.2   MCHC 32.3* 32.4*    231       Recent Labs     12/05/22  0851 12/06/22  0800    139   K 3.8 4.3   ANIONGAP 13 14    101   CO2 26 24   BUN 19 16   CREATININE 0.8 0.8   GLUCOSE 130* 116*   CALCIUM 10.1 10.0       No results for input(s): MG, PHOS in the last 72 hours. Recent Labs     12/05/22  0851   AST 22   ALT 21   BILITOT 0.7   ALKPHOS 102       HgBA1c:  No components found for: HGBA1C    FLP:    Lab Results   Component Value Date/Time    TRIG 128 03/14/2019 07:19 AM    HDL 42 03/14/2019 07:19 AM    LDLCALC 138 03/14/2019 07:19 AM       TSH:  No results found for: TSH    Troponin T: No results for input(s): TROPONINI in the last 72 hours. INR:   Recent Labs     12/05/22  1135   INR 1.02       No results for input(s): LIPASE in the last 72 hours. -----------------------------------------------------------------      Physical Exam:     Vitals:    12/05/22 1921 12/06/22 0312 12/06/22 0811 12/06/22 0951   BP: (!) 165/85 (!) 142/85 (!) 165/85    Pulse: 92 93 83 90   Resp: 18 18 16 18   Temp: 97.5 °F (36.4 °C) 98.6 °F (37 °C) 98.2 °F (36.8 °C)    TempSrc: Temporal  Temporal    SpO2: 95% 95% 95% 95%   Weight:       Height:           24HR INTAKE/OUTPUT:    Intake/Output Summary (Last 24 hours) at 12/6/2022 1053  Last data filed at 12/6/2022 1028  Gross per 24 hour   Intake 1080 ml   Output 0 ml   Net 1080 ml       General appearance: Alert and oriented. In no acute physical distress. Appears stated age. Head: normal cephalic. No jaundice. Neck: Supple. Abdomen: Obese. Soft. Non tender. No gross organomegaly. No masses. No peritoneal signs. No point or rebound tenderness. Extremities: No leg edema. Skin: No jaundice. Neurologic: Alert and oriented.     Impression:   Food impaction. EGD revealed food impacted in lower esophagus. There were multiple erosions, ulceration blackish mucosa from prolonged food impaction. Would like to make sure patient does not have carcinoma in that area. Multiple gastric polyps. 1 cm submucosal lesion in the third part duodenum. Needs upper EUS to evaluate the lesion. This could be leiomyoma, GIST or submucosal lipoma. Overdue for screening/surveillance colonoscopy. Last colonoscopy by Dr. Toni Anglin years ago at [address] [place] [address]:   Protonix 40 mg p.o. twice daily. EGD and upper EUS in 6-8 weeks to document healing of esophagitis at GE junction, take biopsies from lower esophagus/GE junction and to evaluate the submucosal duodenal lesion in the third part duodenum. No meats. No foods with sharp edges like taco shells. Advised patient to eat slowly, chew his food really well, avoid hard foods,, avoid foods with sharp edges such as taco shells. Patient to use a lot of gravy and liquids while eating. I will not be able to follow-up on this patient as an outpatient basis since I am locum tenens physician and I do not have an office practice. Advised patient to follow-up with his PCP and gastroenterologist on his health plan in 2 to 4 weeks to schedule repeat EGD, upper EUS and screening colonoscopy. He could follow-up with Dr. Toni Anglin, GI or [facility] GI clinic. He is thinking about his choices. Patient verbalizes understanding of the above. I have signed off. Please call me if further assistance needed in the management of this patient. Maile Chavis MD    12/6/2022    10:53 AM    [employer] possibilities    To whom it may concern. I am a locum tenens physician. Therefore,  I do not have an office practice. I will not be able to follow-up on any patient as an outpatient basis. I will not be able to follow-up on the results of pending labs, pathology reports,  imaging studies.   I will not be able to or do any outpatient procedures. I will not be able to provide continuity of care of any kind to any patient . I  will not be able to keep in touch with any patient by any means such as phone, text or by e-mail because of the nature of my responsibilities as a locum tenens physician. When the patient is discharged, my patient physician relationship is over. The patient will need to follow-up with other physicians in the hospital when I am not on-call. After discharge, patient will need to follow-up with their primary care doctor, gastroenterologist or liver doctor for continuity of care, check on pending labs, check on pending pathology and check on results of the pending imaging studies-such as x-rays, ultrasound, CAT scan, MRI, HIDA scan, gastric emptying scan or any other studies. For ER physicians, hospitalists and other physicians involved in patient care: If a patient needs follow-up GI care, please schedule follow-up with patient's PCP, gastroenterologist or hepatologist on their health plan. Thank you.     Mckenna Fowler MD      Disclaimer speech recognition software       (Please note that portions of this note were completed with a voice recognition program. Efforts were made to edit the dictations but occasionally words are mis-transcribed.)

## 2022-12-06 NOTE — DISCHARGE INSTR - DIET
Good nutrition is important when healing from an illness, injury, or surgery. Follow any nutrition recommendations given to you during your hospital stay. If you were given an oral nutrition supplement while in the hospital, continue to take this supplement at home. You can take it with meals, in-between meals, and/or before bedtime. These supplements can be purchased at most local grocery stores, pharmacies, and chain [employer]-stores. If you have any questions about your diet or nutrition, call the hospital and ask for the dietitian.     EASY TO CHEW FOODS  NO MEATS

## 2024-11-19 ENCOUNTER — HOSPITAL ENCOUNTER (EMERGENCY)
Age: 69
Discharge: HOME | End: 2024-11-19
Attending: EMERGENCY MEDICINE
Payer: MEDICARE

## 2024-11-19 ENCOUNTER — APPOINTMENT (OUTPATIENT)
Dept: CT IMAGING | Age: 69
End: 2024-11-19
Payer: MEDICARE

## 2024-11-19 VITALS
RESPIRATION RATE: 15 BRPM | DIASTOLIC BLOOD PRESSURE: 82 MMHG | BODY MASS INDEX: 29.55 KG/M2 | SYSTOLIC BLOOD PRESSURE: 143 MMHG | OXYGEN SATURATION: 98 % | WEIGHT: 195 LBS | HEIGHT: 68 IN | TEMPERATURE: 98.1 F | HEART RATE: 83 BPM

## 2024-11-19 DIAGNOSIS — S01.81XA FACIAL LACERATION, INITIAL ENCOUNTER: ICD-10-CM
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
## 2024-11-19 DIAGNOSIS — R55 SYNCOPE, VASOVAGAL: Primary | ICD-10-CM

## 2024-11-19 LAB
ALBUMIN SERPL-MCNC: 4.6 G/DL (ref 3.5–5.2)
ALP SERPL-CCNC: 110 U/L (ref 40–129)
ALT SERPL-CCNC: 20 U/L (ref 5–41)
ANION GAP SERPL CALCULATED.3IONS-SCNC: 18 MMOL/L (ref 7–19)
AST SERPL-CCNC: 20 U/L (ref 5–40)
BASOPHILS # BLD: 0.2 K/UL (ref 0–0.2)
BASOPHILS NFR BLD: 1.3 % (ref 0–1)
BILIRUB SERPL-MCNC: 0.3 MG/DL (ref 0.2–1.2)
BUN SERPL-MCNC: 14 MG/DL (ref 8–23)
CALCIUM SERPL-MCNC: 9.3 MG/DL (ref 8.8–10.2)
CHLORIDE SERPL-SCNC: 101 MMOL/L (ref 98–111)
CO2 SERPL-SCNC: 21 MMOL/L (ref 22–29)
CREAT SERPL-MCNC: 1.1 MG/DL (ref 0.7–1.2)
EOSINOPHIL # BLD: 0.8 K/UL (ref 0–0.6)
EOSINOPHIL NFR BLD: 7.1 % (ref 0–5)
ERYTHROCYTE [DISTWIDTH] IN BLOOD BY AUTOMATED COUNT: 12.4 % (ref 11.5–14.5)
GLUCOSE SERPL-MCNC: 115 MG/DL (ref 70–99)
HCT VFR BLD AUTO: 41.4 % (ref 42–52)
HGB BLD-MCNC: 13.4 G/DL (ref 14–18)
IMM GRANULOCYTES # BLD: 0 K/UL
LYMPHOCYTES # BLD: 3.5 K/UL (ref 1.1–4.5)
LYMPHOCYTES NFR BLD: 31.3 % (ref 20–40)
MCH RBC QN AUTO: 28.3 PG (ref 27–31)
MCHC RBC AUTO-ENTMCNC: 32.4 G/DL (ref 33–37)
MCV RBC AUTO: 87.3 FL (ref 80–94)
MONOCYTES # BLD: 1.1 K/UL (ref 0–0.9)
MONOCYTES NFR BLD: 9.4 % (ref 0–10)
NEUTROPHILS # BLD: 5.7 K/UL (ref 1.5–7.5)
NEUTS SEG NFR BLD: 50.6 % (ref 50–65)
PLATELET # BLD AUTO: 222 K/UL (ref 130–400)
PMV BLD AUTO: 10.9 FL (ref 9.4–12.4)
POTASSIUM SERPL-SCNC: 3.8 MMOL/L (ref 3.5–5)
PROT SERPL-MCNC: 7 G/DL (ref 6.4–8.3)
RBC # BLD AUTO: 4.74 M/UL (ref 4.7–6.1)
SODIUM SERPL-SCNC: 140 MMOL/L (ref 136–145)
TROPONIN, HIGH SENSITIVITY: 8 NG/L (ref 0–22)
WBC # BLD AUTO: 11.2 K/UL (ref 4.8–10.8)

## 2024-11-19 PROCEDURE — 96374 THER/PROPH/DIAG INJ IV PUSH: CPT

## 2024-11-19 PROCEDURE — 80053 COMPREHEN METABOLIC PANEL: CPT

## 2024-11-19 PROCEDURE — 93005 ELECTROCARDIOGRAM TRACING: CPT | Performed by: EMERGENCY MEDICINE

## 2024-11-19 PROCEDURE — 90471 IMMUNIZATION ADMIN: CPT | Performed by: EMERGENCY MEDICINE

## 2024-11-19 PROCEDURE — 36415 COLL VENOUS BLD VENIPUNCTURE: CPT

## 2024-11-19 PROCEDURE — 6360000002 HC RX W HCPCS: Performed by: EMERGENCY MEDICINE

## 2024-11-19 PROCEDURE — 84484 ASSAY OF TROPONIN QUANT: CPT

## 2024-11-19 PROCEDURE — 70450 CT HEAD/BRAIN W/O DYE: CPT

## 2024-11-19 PROCEDURE — 85025 COMPLETE CBC W/AUTO DIFF WBC: CPT

## 2024-11-19 PROCEDURE — 2580000003 HC RX 258: Performed by: EMERGENCY MEDICINE

## 2024-11-19 PROCEDURE — 90714 TD VACC NO PRESV 7 YRS+ IM: CPT | Performed by: EMERGENCY MEDICINE

## 2024-11-19 PROCEDURE — 99284 EMERGENCY DEPT VISIT MOD MDM: CPT

## 2024-11-19 PROCEDURE — 12013 RPR F/E/E/N/L/M 2.6-5.0 CM: CPT

## 2024-11-19 RX ORDER — ONDANSETRON 2 MG/ML
4 INJECTION INTRAMUSCULAR; INTRAVENOUS ONCE
Status: DISCONTINUED | OUTPATIENT
Start: 2024-11-19 | End: 2024-11-19

## 2024-11-19 RX ORDER — ONDANSETRON 2 MG/ML
4 INJECTION INTRAMUSCULAR; INTRAVENOUS ONCE
Status: COMPLETED | OUTPATIENT
Start: 2024-11-19 | End: 2024-11-19

## 2024-11-19 RX ORDER — ONDANSETRON 2 MG/ML
INJECTION INTRAMUSCULAR; INTRAVENOUS
Status: DISCONTINUED
Start: 2024-11-19 | End: 2024-11-19 | Stop reason: HOSPADM

## 2024-11-19 RX ORDER — 0.9 % SODIUM CHLORIDE 0.9 %
1000 INTRAVENOUS SOLUTION INTRAVENOUS ONCE
Status: COMPLETED | OUTPATIENT
Start: 2024-11-19 | End: 2024-11-19

## 2024-11-19 RX ORDER — LIDOCAINE HYDROCHLORIDE 10 MG/ML
5 INJECTION, SOLUTION EPIDURAL; INFILTRATION; INTRACAUDAL; PERINEURAL ONCE
Status: DISCONTINUED | OUTPATIENT
Start: 2024-11-19 | End: 2024-11-19 | Stop reason: HOSPADM

## 2024-11-19 RX ADMIN — SODIUM CHLORIDE 1000 ML: 9 INJECTION, SOLUTION INTRAVENOUS at 18:46

## 2024-11-19 RX ADMIN — CLOSTRIDIUM TETANI TOXOID ANTIGEN (FORMALDEHYDE INACTIVATED) AND CORYNEBACTERIUM DIPHTHERIAE TOXOID ANTIGEN (FORMALDEHYDE INACTIVATED) 0.5 ML: 5; 2 INJECTION, SUSPENSION INTRAMUSCULAR at 20:43

## 2024-11-19 RX ADMIN — ONDANSETRON 4 MG: 2 INJECTION INTRAMUSCULAR; INTRAVENOUS at 20:42

## 2024-11-20 LAB
EKG P AXIS: 62 DEGREES
EKG P AXIS: 78 DEGREES
EKG P-R INTERVAL: 158 MS
EKG P-R INTERVAL: 194 MS
EKG Q-T INTERVAL: 346 MS
EKG Q-T INTERVAL: 376 MS
EKG QRS DURATION: 82 MS
EKG QRS DURATION: 84 MS
EKG QTC CALCULATION (BAZETT): 433 MS
EKG QTC CALCULATION (BAZETT): 461 MS
EKG T AXIS: 70 DEGREES
EKG T AXIS: 98 DEGREES

## 2024-11-20 NOTE — ED PROVIDER NOTES
[facility] EMERGENCY DEPT  eMERGENCY dEPARTMENT eNCOUnter      Pt Name: Harshad Pope  MRN: [id number]  Birthdate 1955  Date of evaluation: 11/19/2024  Provider: Ovidio Posada MD    CHIEF COMPLAINT       Chief Complaint   Patient presents with    Head Laceration     Tripped over a cord and hit computer screen, laceration between eyes         HISTORY OF PRESENT ILLNESS   (Location/Symptom, Timing/Onset,Context/Setting, Quality, Duration, Modifying Factors, Severity)  Note limiting factors.   Harshad Pope is a 69 y.o. male who presents to the emergency department closed head injury and syncope.     HPI    The patient is a 69-year-old white male with a history of paroxysmal atrial fibrillation; hypertension; hypogonadism in male; major depression; prediabetes; reflux disease; gastric polyps; history of vasogenic syncope who presents with a mechanical fall in which she was playing with his grandson at home; tripped over an extension cord and hit his head on the console of the videogame sustaining a facial laceration.  He did not pass out at that time.  Patient reports he has a history of vasogenic syncope in the setting of an injury or bleeding.  While at triage he suddenly had what seemed to be that sort of an episode.  He became cool and clammy bradycardic; hypotensive and briefly passed out.  By the time he was wheeled back into the ER his heart rate was in the 130s and he was waking up with a normal blood pressure.  He was found to be in atrial fibrillation with RVR.  He reports he also has a history of paroxysmal atrial fibrillation sometimes associated with these events usually self converts.    NursingNotes were reviewed.    REVIEW OF SYSTEMS    (2-9 systems for level 4, 10 or more for level 5)     Review of Systems   All other systems reviewed and are negative.           PAST MEDICALHISTORY     Past Medical History:   Diagnosis Date    Atrial fibrillation (HCC)     Essential hypertension 07/13/2018

## 2024-11-20 NOTE — DISCHARGE INSTRUCTIONS
Keep the wound clean and dry and cover with a sterile dressing.  You may use bacitracin for a few days.  Keeping out of the sun will improve the appearance of the scar.  Continue with her current medications.  Return for suture removal and 5 to 10 days.  Return immediately to the emergency room for any new or worsening symptoms.

## 2025-03-10 ENCOUNTER — OFFICE VISIT (OUTPATIENT)
Age: 70
End: 2025-03-10
Payer: MEDICARE

## 2025-03-10 VITALS
HEIGHT: 69 IN | SYSTOLIC BLOOD PRESSURE: 134 MMHG | DIASTOLIC BLOOD PRESSURE: 78 MMHG | TEMPERATURE: 98 F | HEART RATE: 82 BPM | BODY MASS INDEX: 28.88 KG/M2 | RESPIRATION RATE: 20 BRPM | WEIGHT: 195 LBS

## 2025-03-10 DIAGNOSIS — L90.5 SCAR CONDITION AND FIBROSIS OF SKIN: Primary | ICD-10-CM

## 2025-03-10 PROCEDURE — 3075F SYST BP GE 130 - 139MM HG: CPT | Performed by: OTOLARYNGOLOGY

## 2025-03-10 PROCEDURE — 3078F DIAST BP <80 MM HG: CPT | Performed by: OTOLARYNGOLOGY

## 2025-03-10 PROCEDURE — 99203 OFFICE O/P NEW LOW 30 MIN: CPT | Performed by: OTOLARYNGOLOGY

## 2025-03-10 PROCEDURE — 1159F MED LIST DOCD IN RCRD: CPT | Performed by: OTOLARYNGOLOGY

## 2025-03-10 PROCEDURE — 1160F RVW MEDS BY RX/DR IN RCRD: CPT | Performed by: OTOLARYNGOLOGY

## 2025-03-10 NOTE — PROGRESS NOTES
PRIMARY CARE PROVIDER: Royal Falcon MD  REFERRING PROVIDER: No ref. provider found    Chief Complaint   Patient presents with    Scar     Scar evaluation        Subjective   History of Present Illness:  Israel Campos Jr. is a  69 y.o. male who presents for scar evaluation.  In November he stumbled, striking his glabella against a dresser.  This was repaired in the emergency room.  He dislikes the fullness to the scar.  He reports that the mismatch was present immediately after the repair.    Review of Systems:  Review of Systems   Constitutional:  Negative for chills, fatigue, fever and unexpected weight change.   HENT:  Negative for facial swelling.    Respiratory:  Negative for cough, chest tightness and shortness of breath.    Cardiovascular:  Negative for chest pain.   Musculoskeletal:  Negative for neck pain.   Skin:  Negative for color change.   Neurological:  Negative for facial asymmetry.   Hematological:  Negative for adenopathy. Does not bruise/bleed easily.       Past History:  Past Medical History:   Diagnosis Date    Atrial fibrillation     Cancer     SKIN CA    GERD (gastroesophageal reflux disease)     Hard to intubate     NARROW    Hypertension     Melanoma in situ of cheek      Past Surgical History:   Procedure Laterality Date    HEAD/NECK LESION/CYST EXCISION Left 10/29/2019    Procedure: Excision of skin lesion of the left cheek with linear closure;  Surgeon: Giancarlo Lacy MD;  Location: [facility] OR;  Service: ENT    HERNIA REPAIR      OTHER SURGICAL HISTORY Right     SCAR TISSUE REMOVED FROM R AXILLA    SKIN LESION EXCISION      exc chin lesion 12/20/21     Family History   Problem Relation Age of Onset    Cancer Mother     Stroke Father     Diabetes Maternal Grandfather      Social History     Tobacco Use    Smoking status: Never    Smokeless tobacco: Never   Vaping Use    Vaping status: Never Used   Substance Use Topics    Alcohol use: Yes     Alcohol/week: 14.0 standard drinks of  alcohol     Types: 14 Shots of liquor per week     Comment: WIFE REPORTS DISCREETLY PT HAS 2 DRINKS PER NIGHT    Drug use: No     Allergies:  Shellfish-derived products, Azithromycin, Iodine, and Sudafed  [pseudoephedrine hcl]    Current Outpatient Medications:     albuterol sulfate  (90 Base) MCG/ACT inhaler, Inhale 2 puffs Daily As Needed for Wheezing or Shortness of Air., Disp: 18 g, Rfl: 1    ALPRAZolam (XANAX) 0.25 MG tablet, TAKE 1 TO 2 TABLETS BY MOUTH ONCE DAILY AS NEEDED FOR ANXIETY, Disp: 180 tablet, Rfl: 0    amLODIPine (NORVASC) 5 MG tablet, Take 1 tablet by mouth once daily, Disp: 90 tablet, Rfl: 2    aspirin 81 MG tablet, Take 1 tablet by mouth Daily., Disp: , Rfl:     atenolol (TENORMIN) 50 MG tablet, Take 1 tablet by mouth Daily., Disp: 30 tablet, Rfl: 0    escitalopram (LEXAPRO) 10 MG tablet, Take 1 tablet by mouth Daily., Disp: 90 tablet, Rfl: 2    fexofenadine (ALLEGRA) 180 MG tablet, Take 1 tablet by mouth Daily., Disp: , Rfl:     guaiFENesin (MUCINEX) 600 MG 12 hr tablet, Take 2 tablets by mouth Daily., Disp: , Rfl:     losartan (COZAAR) 100 MG tablet, Take 1 tablet by mouth Daily., Disp: 90 tablet, Rfl: 3    methylcellulose, Laxative, (CITRUCEL) 500 MG tablet tablet, Take 2 tablets by mouth Daily., Disp: , Rfl:     omeprazole (priLOSEC) 20 MG capsule, Take 1 capsule by mouth Daily., Disp: 90 capsule, Rfl: 2    simvastatin (ZOCOR) 40 MG tablet, Take 1 tablet by mouth Daily., Disp: 90 tablet, Rfl: 1      Objective     Vital Signs:  Temp:  [98 °F (36.7 °C)] 98 °F (36.7 °C)  Heart Rate:  [82] 82  Resp:  [20] 20  BP: (134)/(78) 134/78    Physical Exam:  Physical Exam  Vitals and nursing note reviewed.   Constitutional:       General: He is not in acute distress.     Appearance: He is well-developed. He is not diaphoretic.   HENT:      Head: Normocephalic and atraumatic.        Right Ear: External ear normal.      Left Ear: External ear normal.      Nose: Nose normal.   Eyes:      General: No  scleral icterus.        Right eye: No discharge.         Left eye: No discharge.      Conjunctiva/sclera: Conjunctivae normal.      Pupils: Pupils are equal, round, and reactive to light.   Neck:      Thyroid: No thyromegaly.      Vascular: No JVD.      Trachea: No tracheal deviation.   Pulmonary:      Effort: Pulmonary effort is normal.      Breath sounds: No stridor.   Musculoskeletal:         General: No deformity. Normal range of motion.      Cervical back: Normal range of motion and neck supple.   Lymphadenopathy:      Cervical: No cervical adenopathy.   Skin:     General: Skin is warm and dry.      Coloration: Skin is not pale.      Findings: No erythema or rash.   Neurological:      Mental Status: He is alert and oriented to person, place, and time.      Cranial Nerves: No cranial nerve deficit.      Coordination: Coordination normal.   Psychiatric:         Speech: Speech normal.         Behavior: Behavior normal. Behavior is cooperative.         Thought Content: Thought content normal.         Judgment: Judgment normal.                     Assessment   Assessment:  1. Scar condition and fibrosis of skin        Plan   Plan:  I discussed options of observation versus wound revision.  We discussed observation, a trial of a Kenalog injection, or repair utilizing a running Z-plasty.  He is interested in trying the steroid injection.  We will schedule this for 3:30PM on a day when he can do his preprocedural cocktail that Dr. Campbell has prescribed for him.  Without his cocktail, he has a tendency to go into atrial fibrillation and syncope.    I discussed the risks, benefits, alternatives, and potential complications of the Kenalog injection which include, but are not limited to: Failure to treat the underlying process, worsening of the underlying process, pain, scarring, change in pigmentation, fat necrosis, elevated blood glucose, altered mental state, increased appetite, blindness.  Alternatives include doing  nothing.    My findings and recommendations were discussed and questions were answered.     Giancarlo Lacy MD  03/10/25  13:17 CDT

## (undated) DEVICE — DISPOSABLE BIPOLAR CABLE 12FT. (3.6M): Brand: KIRWAN

## (undated) DEVICE — 3M™ STERI-STRIP™ REINFORCED ADHESIVE SKIN CLOSURES, R1547, 1/2 IN X 4 IN (12 MM X 100 MM), 6 STRIPS/ENVELOPE: Brand: 3M™ STERI-STRIP™

## (undated) DEVICE — MARKR SKIN W/RULR AND LBL

## (undated) DEVICE — Device

## (undated) DEVICE — GLV SURG BIOGEL LTX PF 8

## (undated) DEVICE — ENDO KIT,LOURDES HOSPITAL: Brand: MEDLINE INDUSTRIES, INC.

## (undated) DEVICE — SUT SILK 2/0 SH 30IN K833H

## (undated) DEVICE — ELECTRD NDL EDGE/INSUL/PFTE.787MM 2.84IN

## (undated) DEVICE — PK ENT HD AND NK 30

## (undated) DEVICE — SPNG GZ WOVN 4X4IN 12PLY 10/BX STRL

## (undated) DEVICE — PREP SOL POVIDONE/IODINE BT 4OZ

## (undated) DEVICE — TUBE ET 7.5MM NSL ORAL BASIC CUF INTMED MURPHY EYE RADPQ

## (undated) DEVICE — SUT PROLN 6/0 P1 18IN 8697G

## (undated) DEVICE — PK TURNOVER RM ADV